# Patient Record
Sex: MALE | Race: WHITE | NOT HISPANIC OR LATINO | Employment: FULL TIME | URBAN - METROPOLITAN AREA
[De-identification: names, ages, dates, MRNs, and addresses within clinical notes are randomized per-mention and may not be internally consistent; named-entity substitution may affect disease eponyms.]

---

## 2019-12-05 ENCOUNTER — TRANSCRIBE ORDERS (OUTPATIENT)
Dept: PHYSICAL THERAPY | Facility: HOSPITAL | Age: 44
End: 2019-12-05

## 2019-12-05 DIAGNOSIS — M25.511 RIGHT SHOULDER PAIN, UNSPECIFIED CHRONICITY: Primary | ICD-10-CM

## 2019-12-05 DIAGNOSIS — M54.2 NECK PAIN: ICD-10-CM

## 2019-12-11 ENCOUNTER — APPOINTMENT (OUTPATIENT)
Dept: PHYSICAL THERAPY | Facility: HOSPITAL | Age: 44
End: 2019-12-11

## 2019-12-12 ENCOUNTER — HOSPITAL ENCOUNTER (OUTPATIENT)
Dept: PHYSICAL THERAPY | Facility: HOSPITAL | Age: 44
Setting detail: THERAPIES SERIES
Discharge: HOME | End: 2019-12-12

## 2019-12-12 DIAGNOSIS — M25.511 CHRONIC RIGHT SHOULDER PAIN: ICD-10-CM

## 2019-12-12 DIAGNOSIS — M54.2 NECK PAIN: Primary | ICD-10-CM

## 2019-12-12 DIAGNOSIS — G89.29 CHRONIC RIGHT SHOULDER PAIN: ICD-10-CM

## 2019-12-12 PROCEDURE — 97161 PT EVAL LOW COMPLEX 20 MIN: CPT

## 2019-12-12 PROCEDURE — 97140 MANUAL THERAPY 1/> REGIONS: CPT

## 2019-12-12 PROCEDURE — 97012 MECHANICAL TRACTION THERAPY: CPT

## 2019-12-13 NOTE — THERAPY EVALUATION
Outpatient Physical Therapy Ortho Initial Evaluation   [place]     Patient Name: Jose Luis Zelaya  : 1975  MRN: [id number]  Today's Date: 2019      Visit Date: 2019    There is no problem list on file for this patient.       Past Medical History:   Diagnosis Date   • Anxiety    • Depression    • Diabetes mellitus (CMS/HCC)    • Hypertension    • PEDRITO (obstructive sleep apnea)    • PTSD (post-traumatic stress disorder)         Past Surgical History:   Procedure Laterality Date   • KNEE ARTHROSCOPY     • VASECTOMY         Visit Dx:     ICD-10-CM ICD-9-CM   1. Neck pain M54.2 723.1   2. Chronic right shoulder pain M25.511 719.41    G89.29 338.29         Patient History     Row Name 19 0500             History    Chief Complaint  Difficulty with daily activities;Muscle tenderness;Muscle weakness;Numbness;Pain;Tinglings  -AS      Type of Pain  Neck pain;Shoulder pain  -AS      Date Current Problem(s) Began  -- Several months ago  -AS      Brief Description of Current Complaint  Patient states he injured his right shoulder years ago and had an injection and the pain was resolved. He states a few months ago he started having right shoulder pain again and then started having right sided neck pain with numbness and tingling into his RUE and right hand. 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 He states he is having trouble feeling veins and other important job duties that are affecting his job. He also reports he feels like his  strength has been affected.  -AS      Patient/Caregiver Goals  Relieve pain;Return to prior level of function;Improve strength  -AS      Patient's Rating of General Health  Very good  -AS      Hand Dominance  left-handed  -AS      Occupation/sports/leisure activities  ER Nurse at [employer]  -AS      Patient seeing anyone else for problem(s)?  Kristan Gerber  -AS      How has patient  tried to help current problem?  exercises, heat, ice, home traction unit  -AS      What clinical tests have you had for this problem?  X-ray;MRI 1 year ago, nothing more recent  -AS      Results of Clinical Tests  cervical disc bulge  -AS         Pain     Pain Location  Arm;Neck  -AS      Pain at Present  5  -AS      Pain at Best  3  -AS      Pain at Worst  9  -AS      Pain Frequency  Constant/continuous  -AS      Pain Description  Numbness;Tingling;Aching  -AS      What Performance Factors Make the Current Problem(s) WORSE?  Anything  -AS      What Performance Factors Make the Current Problem(s) BETTER?  Nothing  -AS         Daily Activities    Primary Language  English  -AS      How does patient learn best?  Listening;Reading  -AS      Teaching needs identified  Home Exercise Program;Management of Condition  -AS      Patient is concerned about/has problems with  Flexibility;Performing home management (household chores, shopping, care of dependents);Performing job responsibilities/community activities (work, school,;Performing sports, recreation, and play activities;Reaching over head;Repetitive movements of the hand, arm, shoulder  -AS      Does patient have problems with the following?  Depression;Anxiety  -AS      Barriers to learning  None  -AS      Pt Participated in POC and Goals  Yes  -AS         Safety    Are you being hurt, hit, or frightened by anyone at home or in your life?  No  -AS      Are you being neglected by a caregiver  No  -AS        User Key  (r) = Recorded By, (t) = Taken By, (c) = Cosigned By    Initials Name Provider Type    AS Kevin Arroyo, PT Physical Therapist          PT Ortho     Row Name 12/13/19 1415       Precautions and Contraindications    Precautions/Limitations  no known precautions/limitations  -AS       Subjective Pain    Able to rate subjective pain?  yes  -AS    Pre-Treatment Pain Level  5  -AS    Post-Treatment Pain Level  4  -AS       Posture/Observations     Posture- WNL  Posture is WNL  -AS       Cervical Palpation    Suboccipital  Right:;Tender  -AS    Cervical Facets  Right:;Tender  -AS    Levator Scapula  Right:;Tender  -AS    Upper Traps  Right:;Tender  -AS       Cervical/Thoracic Special Tests    Cervical Compression (Forarminal Compression vs. Facet Pain)  Right:;Positive  -AS    Cervical Distraction (Foraminal Compression vs. Facet Pain)  Right:;Positive  -AS       Head/Neck/Trunk    Neck Extension AROM  33  -AS    Neck Flexion AROM  41  -AS    Neck Lt Lateral Flexion AROM  40  -AS    Neck Rt Lateral Flexion AROM  40  -AS    Neck Lt Rotation AROM  68  -AS    Neck Rt Rotation AROM  60  -AS       MMT Neck/Trunk    Neck Flexion MMT, Gross Movement  (4-/5) good minus  -AS    Neck Extension MMT, Gross Movement  (4-/5) good minus  -AS       MMT Right Upper Ext    Rt Shoulder Flexion MMT, Gross Movement  (4/5) good  -AS    Rt Shoulder ABduction MMT, Gross Movement  (4/5) good  -AS    Rt Shoulder Internal Rotation MMT, Gross Movement  (4/5) good  -AS    Rt Shoulder External Rotation MMT, Gross Movement  (4/5) good  -AS       Sensation    Light Touch  Partial deficits in the RUE  -AS       Upper Extremity Flexibility    Upper Trapezius  Right:;Mildly limited  -AS    Levator Scapula  Right:;Mildly limited  -AS      User Key  (r) = Recorded By, (t) = Taken By, (c) = Cosigned By    Initials Name Provider Type    AS Kevin Arroyo, PT Physical Therapist                      Therapy Education  Given: HEP, Symptoms/condition management, Pain management  Program: New  How Provided: Verbal, Demonstration, Written  Provided to: Patient  Level of Understanding: Teach back education performed, Verbalized, Demonstrated     PT OP Goals     Row Name 12/13/19 0500          PT Short Term Goals    STG Date to Achieve  01/03/20  -AS     STG 1  Patient to report decreased radicular symptoms in his RUE by 25-50%.  -AS     STG 2  Patient to demonstrate improved neck strength to 4/5 in  all planes.  -AS     STG 3  Patient to demonstrate improved RUE strength to 4+/5 in all planes.  -AS        Long Term Goals    LTG Date to Achieve  01/24/20  -AS     LTG 1  Patient to report decreased radicular symptoms in his RUE by %.  -AS     LTG 2  Patient to demonstrate improved neck strength to 4+/5 in all planes.  -AS     LTG 3  Patient to demonstrate improved RUE strength to 5/5 in all planes.  -AS     LTG 4  Patient to demonstrate improved cervical ROM to WNL in all planes.  -AS     LTG 5  Patient to report improved function and decreased pain on Quick DASH by >10-15 points.  -AS        Time Calculation    PT Goal Re-Cert Due Date  01/10/20  -AS       User Key  (r) = Recorded By, (t) = Taken By, (c) = Cosigned By    Initials Name Provider Type    AS Kevin Arroyo, PT Physical Therapist          PT Assessment/Plan     Row Name 12/13/19 0500          PT Assessment    Functional Limitations  Limitation in home management;Limitations in community activities;Performance in leisure activities;Performance in self-care ADL;Performance in sport activities;Performance in work activities  -AS     Impairments  Muscle strength;Pain;Range of motion  -AS     Assessment Comments  Patient reports to outpatient PT with complaints of RUE and neck pain the past several months. 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 Patient demonstrates limited cervical ROM, limited cervical and RUE strength, and increased RUE symptoms with activity. Patient has limited function at this time secondary to the above.  -AS     Please refer to paper survey for additional self-reported information  Yes  -AS     Rehab Potential  Good  -AS     Patient/caregiver participated in establishment of treatment plan and goals  Yes  -AS     Patient would benefit from skilled therapy intervention  Yes  -AS        PT Plan    PT Frequency  2x/week;3x/week  -AS      Predicted Duration of Therapy Intervention (Therapy Eval)  4-6 weeks  -AS     Planned CPT's?  PT RE-EVAL: 36339;PT THER PROC EA 15 MIN: 72086;PT THER ACT EA 15 MIN: 66131;PT MANUAL THERAPY EA 15 MIN: 78731;PT NEUROMUSC RE-EDUCATION EA 15 MIN: 19139;PT ELECTRICAL STIM UNATTEND: ;PT ULTRASOUND EA 15 MIN: 95882;PT TRACTION CERVICAL: 56691;PT HOT/COLD PACK WC NONMCARE: 52542  -AS       User Key  (r) = Recorded By, (t) = Taken By, (c) = Cosigned By    Initials Name Provider Type    AS Kevin Arroyo, PT Physical Therapist          Modalities     Row Name 12/13/19 0500             Moist Heat    MH Applied  Yes  -AS      Location  Cervical - Sitting  -AS      Rx Minutes  10 mins  -AS      MH Prior to Rx  Yes  -AS         Traction 14873    Traction Type  Cervical  -AS      Rx Minutes  15  -AS      Duration  Intermittent  -AS      Position  Supine  -AS      Weight  25 started at 15#, patient requested the pull to be increased  -AS      Hold  60  -AS      Relax  10  -AS        User Key  (r) = Recorded By, (t) = Taken By, (c) = Cosigned By    Initials Name Provider Type    AS Kevin Arroyo, PT Physical Therapist        OP Exercises     Row Name 12/13/19 0500             Subjective Pain    Able to rate subjective pain?  yes  -AS      Pre-Treatment Pain Level  5  -AS      Post-Treatment Pain Level  4  -AS        User Key  (r) = Recorded By, (t) = Taken By, (c) = Cosigned By    Initials Name Provider Type    AS Kevin Arroyo, PT Physical Therapist           Manual Rx (last 36 hours)      Manual Treatments     Row Name 12/13/19 0500             Manual Rx 1    Manual Rx 1 Location  Cervical  -AS      Manual Rx 1 Type  Manual Distraction  -AS      Manual Rx 1 Duration  10 min  -AS        User Key  (r) = Recorded By, (t) = Taken By, (c) = Cosigned By    Initials Name Provider Type    AS Kevin Arroyo, PT Physical Therapist                      Outcome Measure Options: Quick DASH  Quick DASH  Open a  tight or new jar.: Mild Difficulty  Do heavy household chores (e.g., wash walls, wash floors): Mild Difficulty  Carry a shopping bag or briefcase: Mild Difficulty  Wash your back: Moderate Difficulty  Use a knife to cut food: Mild Difficulty  Recreational activities in which you take some force or impact through your arm, should or hand (e.g. golf, hammering, tennis, etc.): Mild Difficulty  During the past week, to what extent has your arm, shoulder, or hand problem interfered with your normal social activites with family, friends, neighbors or groups?: Moderately  During the past week, were you limited in your work or other regular daily activities as a result of your arm, shoulder or hand problem?: Moderately Limited  Arm, Shoulder, or hand pain: Moderate  Tingling (pins and needles) in your arm, shoulder, or hand: Severe  During the past week, how much difficulty have you had sleeping because of the pain in your arm, shoulder or hand?: Moderate Difficiculty  Number of Questions Answered: 11  Quick DASH Score: 40.91  Work Module (Optional)  Using your usual technique for your work?: Moderate Difficulty  Doing your usual work because of arm, shoulder or hand pain?: Moderate Difficulty  Doing your work as well as you would like?: Moderate Difficulty  Spending your usual amount of time doing your work?: Mild Difficulty  Work Module Score: 43.75         Time Calculation:     Start Time: 0900  Stop Time: 0958  Time Calculation (min): 58 min     Therapy Charges for Today     Code Description Service Date Service Provider Modifiers Qty    74218315537 HC PT EVAL LOW COMPLEXITY 1 12/12/2019 Kevin Arroyo, PT GP 1    75204022580 HC PT TRACTION CERVICAL 12/12/2019 Kevin Arroyo, PT GP 1    31446579493 HC PT MANUAL THERAPY EA 15 MIN 12/12/2019 Kevin Arroyo, PT GP 1          PT G-Codes  Outcome Measure Options: Quick DASH  Quick DASH Score: 40.91         Kevin Arroyo, PT  12/13/2019

## 2019-12-18 ENCOUNTER — HOSPITAL ENCOUNTER (OUTPATIENT)
Dept: PHYSICAL THERAPY | Facility: HOSPITAL | Age: 44
Setting detail: THERAPIES SERIES
Discharge: HOME | End: 2019-12-18

## 2019-12-18 DIAGNOSIS — M25.511 CHRONIC RIGHT SHOULDER PAIN: ICD-10-CM

## 2019-12-18 DIAGNOSIS — M54.2 NECK PAIN: Primary | ICD-10-CM

## 2019-12-18 DIAGNOSIS — G89.29 CHRONIC RIGHT SHOULDER PAIN: ICD-10-CM

## 2019-12-18 PROCEDURE — 97012 MECHANICAL TRACTION THERAPY: CPT

## 2019-12-18 NOTE — THERAPY TREATMENT NOTE
Outpatient Physical Therapy Ortho Treatment Note  [facility]     Patient Name: Jose Luis Zelaya  : 1975  MRN: [id number]  Today's Date: 2019      Visit Date: 2019    Visit Dx:    ICD-10-CM ICD-9-CM   1. Neck pain M54.2 723.1   2. Chronic right shoulder pain M25.511 719.41    G89.29 338.29       There is no problem list on file for this patient.       Past Medical History:   Diagnosis Date   • Anxiety    • Depression    • Diabetes mellitus (CMS/HCC)    • Hypertension    • PEDRITO (obstructive sleep apnea)    • PTSD (post-traumatic stress disorder)         Past Surgical History:   Procedure Laterality Date   • KNEE ARTHROSCOPY     • VASECTOMY                         PT Assessment/Plan     Row Name 19          PT Assessment    Assessment Comments  Continued with cervical traction today as patient reports good tolerance to this at his last treatment session as well as decreased symptoms into his right hand/fingers.  -AS        PT Plan    PT Plan Comments  Continue with current treatment plan.  -AS       User Key  (r) = Recorded By, (t) = Taken By, (c) = Cosigned By    Initials Name Provider Type    AS Kevin Arroyo, PT Physical Therapist          Modalities     Row Name 19             Traction 25774    Traction Type  Cervical  -AS      Rx Minutes  20  -AS      Duration  Intermittent  -AS      Position  Supine  -AS      Weight  25 started at 15#, patient requested the pull to be increased  -AS      Hold  80  -AS      Relax  10  -AS        User Key  (r) = Recorded By, (t) = Taken By, (c) = Cosigned By    Initials Name Provider Type    AS Kevin Arroyo, PT Physical Therapist        OP Exercises     Row Name 19             Subjective Comments    Subjective Comments  Patient states he tolerated his traction well at his last treatment session with little to no soreness. He states he feels the numbness and tingling into his finger tips have decreased.  -AS          Subjective Pain    Able to rate subjective pain?  yes  -AS      Pre-Treatment Pain Level  4  -AS      Post-Treatment Pain Level  3  -AS        User Key  (r) = Recorded By, (t) = Taken By, (c) = Cosigned By    Initials Name Provider Type    AS Kevin Arroyo, PT Physical Therapist                                          Time Calculation:   Start Time: 0700  Stop Time: 0736  Time Calculation (min): 36 min  Therapy Charges for Today     Code Description Service Date Service Provider Modifiers Qty    78096000734 HC PT TRACTION CERVICAL 12/18/2019 Kevin Arroyo, PT GP 1                    Kevin Arroyo, PT  12/18/2019

## 2019-12-19 ENCOUNTER — HOSPITAL ENCOUNTER (OUTPATIENT)
Dept: PHYSICAL THERAPY | Facility: HOSPITAL | Age: 44
Setting detail: THERAPIES SERIES
Discharge: HOME | End: 2019-12-19

## 2019-12-19 DIAGNOSIS — M25.511 CHRONIC RIGHT SHOULDER PAIN: ICD-10-CM

## 2019-12-19 DIAGNOSIS — M54.2 NECK PAIN: Primary | ICD-10-CM

## 2019-12-19 DIAGNOSIS — G89.29 CHRONIC RIGHT SHOULDER PAIN: ICD-10-CM

## 2019-12-19 PROCEDURE — 97012 MECHANICAL TRACTION THERAPY: CPT

## 2019-12-19 NOTE — THERAPY TREATMENT NOTE
Outpatient Physical Therapy Ortho Treatment Note  [facility]     Patient Name: Jose Luis Zelaya  : 1975  MRN: [id number]  Today's Date: 2019      Visit Date: 2019    Visit Dx:    ICD-10-CM ICD-9-CM   1. Neck pain M54.2 723.1   2. Chronic right shoulder pain M25.511 719.41    G89.29 338.29       There is no problem list on file for this patient.       Past Medical History:   Diagnosis Date   • Anxiety    • Depression    • Diabetes mellitus (CMS/HCC)    • Hypertension    • PEDRITO (obstructive sleep apnea)    • PTSD (post-traumatic stress disorder)         Past Surgical History:   Procedure Laterality Date   • KNEE ARTHROSCOPY     • VASECTOMY                         PT Assessment/Plan     Row Name 19 0700          PT Assessment    Assessment Comments  Patient continues to demonstrate improvement in his symptoms with cervical traction.  -AS        PT Plan    PT Plan Comments  Continue with current treatment plan.  -AS       User Key  (r) = Recorded By, (t) = Taken By, (c) = Cosigned By    Initials Name Provider Type    AS Kevin Arroyo, PT Physical Therapist          Modalities     Row Name 19 0600             Traction 58750    Traction Type  Cervical  -AS      Rx Minutes  20  -AS      Duration  Intermittent  -AS      Position  Supine  -AS      Weight  27 Started at 27#, pt. requested the traction be increased  -AS      Hold  80  -AS      Relax  10  -AS        User Key  (r) = Recorded By, (t) = Taken By, (c) = Cosigned By    Initials Name Provider Type    AS Kevin Arroyo, PT Physical Therapist        OP Exercises     Row Name 19 0600             Subjective Comments    Subjective Comments  Patient states he was a little sore after his last treatment session but states it was not bad. Patient reports he is doing well this morning.  -AS        User Key  (r) = Recorded By, (t) = Taken By, (c) = Cosigned By    Initials Name Provider Type    AS Kevin Arroyo, PT  Physical Therapist                                          Time Calculation:   Start Time: 0650  Stop Time: 0721  Time Calculation (min): 31 min  Therapy Charges for Today     Code Description Service Date Service Provider Modifiers Qty    61523967118 HC PT TRACTION CERVICAL 12/19/2019 Kevin Arroyo, PT GP 1                    Kevin Arroyo, PT  12/19/2019

## 2019-12-23 ENCOUNTER — HOSPITAL ENCOUNTER (OUTPATIENT)
Dept: PHYSICAL THERAPY | Facility: HOSPITAL | Age: 44
Setting detail: THERAPIES SERIES
Discharge: HOME | End: 2019-12-23

## 2019-12-23 DIAGNOSIS — G89.29 CHRONIC RIGHT SHOULDER PAIN: ICD-10-CM

## 2019-12-23 DIAGNOSIS — M54.2 NECK PAIN: Primary | ICD-10-CM

## 2019-12-23 DIAGNOSIS — M25.511 CHRONIC RIGHT SHOULDER PAIN: ICD-10-CM

## 2019-12-23 PROCEDURE — 97012 MECHANICAL TRACTION THERAPY: CPT

## 2019-12-23 NOTE — THERAPY TREATMENT NOTE
Outpatient Physical Therapy Ortho Treatment Note  [facility]     Patient Name: Jose Luis Zelaya  : 1975  MRN: [id number]  Today's Date: 2019      Visit Date: 2019    Visit Dx:    ICD-10-CM ICD-9-CM   1. Neck pain M54.2 723.1   2. Chronic right shoulder pain M25.511 719.41    G89.29 338.29       There is no problem list on file for this patient.       Past Medical History:   Diagnosis Date   • Anxiety    • Depression    • Diabetes mellitus (CMS/HCC)    • Hypertension    • PEDRITO (obstructive sleep apnea)    • PTSD (post-traumatic stress disorder)         Past Surgical History:   Procedure Laterality Date   • KNEE ARTHROSCOPY     • VASECTOMY                         PT Assessment/Plan     Row Name 19 0600          PT Assessment    Assessment Comments  Patient is reporting benefits from cervical traction.  -AS        PT Plan    PT Plan Comments  Continue with current treatment plan.  -AS       User Key  (r) = Recorded By, (t) = Taken By, (c) = Cosigned By    Initials Name Provider Type    AS Kevin Arroyo, PT Physical Therapist          Modalities     Row Name 19 0500             Subjective Comments    Subjective Comments  Patient states he felt good after his last treatment session but states his pain returned over the weekend.  -AS         Traction 84038    Traction Type  Cervical  -AS      Rx Minutes  20  -AS      Duration  Intermittent  -AS      Position  Supine  -AS      Weight  30  -AS      Hold  80  -AS      Relax  10  -AS        User Key  (r) = Recorded By, (t) = Taken By, (c) = Cosigned By    Initials Name Provider Type    AS Kevin Arroyo, PT Physical Therapist        OP Exercises     Row Name 19 0500             Subjective Comments    Subjective Comments  Patient states he felt good after his last treatment session but states his pain returned over the weekend.  -AS        User Key  (r) = Recorded By, (t) = Taken By, (c) = Cosigned By    Initials Name  Provider Type    AS Kevin Arroyo, PT Physical Therapist                                          Time Calculation:   Start Time: 0643  Stop Time: 0707  Time Calculation (min): 24 min  Therapy Charges for Today     Code Description Service Date Service Provider Modifiers Qty    37807882329 HC PT TRACTION CERVICAL 12/23/2019 Kevin Arroyo, PT GP 1                    Kevin Arroyo, PT  12/23/2019

## 2019-12-27 ENCOUNTER — HOSPITAL ENCOUNTER (OUTPATIENT)
Dept: PHYSICAL THERAPY | Facility: HOSPITAL | Age: 44
Setting detail: THERAPIES SERIES
Discharge: HOME | End: 2019-12-27

## 2019-12-27 DIAGNOSIS — M25.511 CHRONIC RIGHT SHOULDER PAIN: ICD-10-CM

## 2019-12-27 DIAGNOSIS — G89.29 CHRONIC RIGHT SHOULDER PAIN: ICD-10-CM

## 2019-12-27 DIAGNOSIS — M54.2 NECK PAIN: Primary | ICD-10-CM

## 2019-12-27 PROCEDURE — 97012 MECHANICAL TRACTION THERAPY: CPT

## 2019-12-27 NOTE — THERAPY TREATMENT NOTE
Outpatient Physical Therapy Ortho Treatment Note  [facility]     Patient Name: Jose Luis Zelaya  : 1975  MRN: [id number]  Today's Date: 2019      Visit Date: 2019    Visit Dx:    ICD-10-CM ICD-9-CM   1. Neck pain M54.2 723.1   2. Chronic right shoulder pain M25.511 719.41    G89.29 338.29       There is no problem list on file for this patient.       Past Medical History:   Diagnosis Date   • Anxiety    • Depression    • Diabetes mellitus (CMS/HCC)    • Hypertension    • PEDRITO (obstructive sleep apnea)    • PTSD (post-traumatic stress disorder)         Past Surgical History:   Procedure Laterality Date   • KNEE ARTHROSCOPY     • VASECTOMY                         PT Assessment/Plan     Row Name 19 06          PT Assessment    Assessment Comments  Patient continues to report relief in his symptoms with cervical traction, however his relief is temporary and lasts about 4-5 hours and then his symptoms return.  -AS        PT Plan    PT Plan Comments  Continue with current treatment plan.  -AS       User Key  (r) = Recorded By, (t) = Taken By, (c) = Cosigned By    Initials Name Provider Type    AS Kevin Arroyo, PT Physical Therapist          Modalities     Row Name 19 06             Traction 95442    Traction Type  Cervical  -AS      Rx Minutes  20  -AS      Duration  Intermittent  -AS      Position  Supine  -AS      Weight  30  -AS      Hold  80  -AS      Relax  10  -AS        User Key  (r) = Recorded By, (t) = Taken By, (c) = Cosigned By    Initials Name Provider Type    AS Kevin Arroyo, PT Physical Therapist        OP Exercises     Row Name 19 06             Subjective Comments    Subjective Comments  Patient states he continues to have the numbness in his right fingers/hand. He denies having pain at this time. He states he does get relief from the cervical traction and last about 4-5 hours but then his symptoms return.  -AS        User Key  (r) = Recorded  By, (t) = Taken By, (c) = Cosigned By    Initials Name Provider Type    AS Kevin Arroyo, PT Physical Therapist                                          Time Calculation:   Start Time: 0649  Stop Time: 0716  Time Calculation (min): 27 min  Therapy Charges for Today     Code Description Service Date Service Provider Modifiers Qty    81338991024 HC PT TRACTION CERVICAL 12/27/2019 Kevin Arroyo, PT GP 1                    Kevin Arroyo, PT  12/27/2019

## 2019-12-31 ENCOUNTER — HOSPITAL ENCOUNTER (OUTPATIENT)
Dept: PHYSICAL THERAPY | Facility: HOSPITAL | Age: 44
Setting detail: THERAPIES SERIES
Discharge: HOME | End: 2019-12-31

## 2019-12-31 DIAGNOSIS — M25.511 CHRONIC RIGHT SHOULDER PAIN: ICD-10-CM

## 2019-12-31 DIAGNOSIS — G89.29 CHRONIC RIGHT SHOULDER PAIN: ICD-10-CM

## 2019-12-31 DIAGNOSIS — M54.2 NECK PAIN: Primary | ICD-10-CM

## 2019-12-31 PROCEDURE — 97012 MECHANICAL TRACTION THERAPY: CPT

## 2020-01-02 ENCOUNTER — HOSPITAL ENCOUNTER (OUTPATIENT)
Dept: PHYSICAL THERAPY | Facility: HOSPITAL | Age: 45
Setting detail: THERAPIES SERIES
Discharge: HOME | End: 2020-01-02

## 2020-01-02 DIAGNOSIS — M54.2 NECK PAIN: Primary | ICD-10-CM

## 2020-01-02 DIAGNOSIS — G89.29 CHRONIC RIGHT SHOULDER PAIN: ICD-10-CM

## 2020-01-02 DIAGNOSIS — M25.511 CHRONIC RIGHT SHOULDER PAIN: ICD-10-CM

## 2020-01-02 PROCEDURE — 97012 MECHANICAL TRACTION THERAPY: CPT

## 2020-01-02 NOTE — THERAPY TREATMENT NOTE
Outpatient Physical Therapy Ortho Treatment Note  [facility]     Patient Name: Jose Luis Zelaya  : 1975  MRN: [id number]  Today's Date: 2020      Visit Date: 2020    Visit Dx:    ICD-10-CM ICD-9-CM   1. Neck pain M54.2 723.1   2. Chronic right shoulder pain M25.511 719.41    G89.29 338.29       There is no problem list on file for this patient.       Past Medical History:   Diagnosis Date   • Anxiety    • Depression    • Diabetes mellitus (CMS/HCC)    • Hypertension    • PEDRITO (obstructive sleep apnea)    • PTSD (post-traumatic stress disorder)         Past Surgical History:   Procedure Laterality Date   • KNEE ARTHROSCOPY     • VASECTOMY                         PT Assessment/Plan     Row Name 20 06          PT Assessment    Assessment Comments  Discussed with patient about contacting his MD for further follow-up. he does continue to have relief in his symptoms but his relief is temporary.  -AS        PT Plan    PT Plan Comments  Continue with current treatment plan.  -AS       User Key  (r) = Recorded By, (t) = Taken By, (c) = Cosigned By    Initials Name Provider Type    AS Kevin Arroyo, PT Physical Therapist          Modalities     Row Name 20 0600             Traction 85387    Traction Type  Cervical  -AS      Rx Minutes  20  -AS      Duration  Intermittent  -AS      Position  Supine  -AS      Weight  30  -AS      Hold  70  -AS      Relax  10  -AS        User Key  (r) = Recorded By, (t) = Taken By, (c) = Cosigned By    Initials Name Provider Type    AS Kevin Arroyo, PT Physical Therapist        OP Exercises     Row Name 20 06             Subjective Comments    Subjective Comments  Patient states he continues to have relief in his symptoms with cervical traction but his relief is temporary.  -AS        User Key  (r) = Recorded By, (t) = Taken By, (c) = Cosigned By    Initials Name Provider Type    AS Kevin Arroyo, PT Physical Therapist                                           Time Calculation:   Start Time: 0637  Stop Time: 0703  Time Calculation (min): 26 min  Therapy Charges for Today     Code Description Service Date Service Provider Modifiers Qty    98859864787 HC PT TRACTION CERVICAL 1/2/2020 Kevin Arroyo, PT GP 1                    Kevin Arroyo, PT  1/2/2020

## 2020-07-23 ENCOUNTER — TELEPHONE (OUTPATIENT)
Dept: ORTHOPEDIC SURGERY | Facility: CLINIC | Age: 45
End: 2020-07-23

## 2020-07-23 NOTE — TELEPHONE ENCOUNTER
FYI: Patient has an appointment with [facility] on 7/27 as a new patient, and called to inform the office he has been diagnosed with Shingles which is in unexposed area.

## 2020-08-03 ENCOUNTER — OFFICE VISIT (OUTPATIENT)
Dept: ORTHOPEDIC SURGERY | Facility: CLINIC | Age: 45
End: 2020-08-03

## 2020-08-03 VITALS — BODY MASS INDEX: 37.77 KG/M2 | HEIGHT: 69 IN | TEMPERATURE: 96.1 F | WEIGHT: 255 LBS

## 2020-08-03 DIAGNOSIS — S80.01XA CONTUSION OF RIGHT KNEE, INITIAL ENCOUNTER: Primary | ICD-10-CM

## 2020-08-03 PROCEDURE — 99203 OFFICE O/P NEW LOW 30 MIN: CPT | Performed by: NURSE PRACTITIONER

## 2020-08-03 RX ORDER — LISINOPRIL AND HYDROCHLOROTHIAZIDE 20; 12.5 MG/1; MG/1
1 TABLET ORAL DAILY
COMMUNITY

## 2020-08-03 RX ORDER — MELOXICAM 15 MG/1
15 TABLET ORAL DAILY
Qty: 30 TABLET | Refills: 1 | Status: SHIPPED | OUTPATIENT
Start: 2020-08-03 | End: 2020-09-28

## 2020-08-03 NOTE — PROGRESS NOTES
Patient: Jose Luis Zelaya    YOB: 1975    Medical Record Number: [id number]    Chief Complaints:  Right knee injury    History of Present Illness:     45 y.o. male patient who comes in for evaluation of the right knee injury.  Reports injury occurred on 7/18/2020.  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  Reports he slipped on the wet floor at the nurses station.  He landed awkwardly on the right knee.  He used ice and Tylenol and finish his shift that day and worked a full shift the following day.  Reports his pain and swelling persisted.  He was evaluated at [facility] on 07/23/20 with x-rays which were reportedly negative for fracture.  Describes his pain as moderate, constant, and stabbing.  Pain is worse with standing, sitting, driving, and walking.  He has used ice, elevation, rest, Tylenol and Motrin without relief.  Reports a snapping sensation of the patellar tendon.  Denies any instability.  Denies any radicular symptoms down the leg.  Denies other associated complaints or issues.    Allergies: No Known Allergies    Home Medications:    Current Outpatient Medications:   •  busPIRone (BUSPAR) 15 MG tablet, Take 15 mg by mouth 3 (Three) Times a Day., Disp: , Rfl:   •  cloNIDine (CATAPRES) 0.2 MG tablet, Take 0.2 mg by mouth 2 (Two) Times a Day., Disp: , Rfl:   •  Empagliflozin (JARDIANCE PO), Take 25 mg by mouth Daily., Disp: , Rfl:   •  lisinopril-hydrochlorothiazide (PRINZIDE,ZESTORETIC) 20-12.5 MG per tablet, Take 1 tablet by mouth Daily., Disp: , Rfl:   •  Melatonin 10 MG tablet, Take  by mouth., Disp: , Rfl:   •  metFORMIN (GLUCOPHAGE) 1000 MG tablet, Take 1,000 mg by mouth 2 (Two) Times a Day With Meals., Disp: , Rfl:   •  Multiple Vitamin (MULTI-VITAMIN DAILY PO), Take  by mouth., Disp: , Rfl:   •  sertraline (ZOLOFT) 50 MG tablet, Take 50 mg by mouth Daily., Disp: , Rfl:   •  lisinopril (PRINIVIL,ZESTRIL) 20 MG tablet, Take 20 mg by mouth Daily., Disp: , Rfl:    •  meloxicam (Mobic) 15 MG tablet, Take 1 tablet by mouth Daily., Disp: 30 tablet, Rfl: 1  •  SAXagliptin (ONGLYZA) 2.5 MG tablet, Take 2.5 mg by mouth Daily., Disp: , Rfl:   •  telmisartan-hydrochlorothiazide (MICARDIS HCT) 80-12.5 MG per tablet, Take 1 tablet by mouth Daily., Disp: , Rfl:     Past Medical History:   Diagnosis Date   • Anxiety    • Depression    • Diabetes mellitus (CMS/HCC)    • Hypertension    • PEDRITO (obstructive sleep apnea)    • PTSD (post-traumatic stress disorder)        Past Surgical History:   Procedure Laterality Date   • KNEE ARTHROSCOPY     • VASECTOMY         Social History     Occupational History   • Not on file   Tobacco Use   • Smoking status: Former Smoker   • Smokeless tobacco: Never Used   Substance and Sexual Activity   • Alcohol use: Yes   • Drug use: Defer   • Sexual activity: Defer      Social History     Social History Narrative   • Not on file       No family history on file.    Review of Systems:      Constitutional: Denies fever, shaking or chills   Eyes: Denies change in visual acuity   HEENT: Denies nasal congestion or sore throat   Respiratory: Denies cough or shortness of breath   Cardiovascular: Denies chest pain or edema  Endocrine: Denies tremors, palpitations, intolerance of heat or cold, polyuria, polydipsia.  GI: Denies abdominal pain, nausea, vomiting, bloody stools or diarrhea  : Denies frequency, urgency, incontinence, retention, or nocturia.  Musculoskeletal: Denies numbness, tingling or loss of motor function except as above  Integument: Denies rash, lesion or ulceration   Neurologic: Denies headache or focal weakness, deficits  Heme: Denies spontaneous or excessive bleeding, epistaxis, hematuria, melena, fatigue, enlarged or tender lymph nodes.      All other pertinent positives and negatives as noted above in HPI.    Physical Exam:   45 y.o. male  Vitals:    08/03/20 0800   Temp: 96.1 °F (35.6 °C)   TempSrc: Temporal   Weight: 116 kg (255 lb)   Height:  "175.3 cm (69\")     General:  Patient is awake and alert.  Appears in no acute distress or discomfort.    Psych:  Affect and demeanor are appropriate.    Eyes:  Conjunctiva and sclera appear grossly normal.  Eyes track well and EOM seem to be intact.    Ears:  No gross abnormalities.  Hearing adequate for the exam.    Cardiovascular:  Regular rate and rhythm.    Lungs:  Good chest expansion.  Breathing unlabored.    Spine:  Back appears grossly normal.  No palpable masses or adenopathy.  Good motion.  Straight leg raise and crossed straight leg raise manuever are both negative for lower leg and/or knee pain.    Right lower extremity:  Knee immobilizer is removed.  Skin is benign.   No obvious gross abnormalities on inspection including any swelling, masses, atrophy or obvious adenopathy.  No palpable masses or adenopathy.  No effusion.  Exquisite tenderness over the patellar tendon.  Motion is full and symmetric.  Some anterior crepitus noted with range of motion.  Negative medial and lateral Yola's.  Positive patellar grind test.  No instability or patellar apprehension.  Strength is well preserved including hip flexion, knee extension, ankle and toe plantarflexion, ankle inversion and eversion.  Good sensory function throughout the leg and foot.  Palpable pulses.  Gait is mildly antalgic.           Radiology:  Dr. Altman and I reviewed the x-rays together.  Outside bilateral AP views, merchants view of right knee, and a lateral view of the right knee are reviewed to evaluate the patient's complaint.  No comparison films are immediately available.  In the merchant view, there is a small cyst noted on the trochlea and the patella appears to be tilted laterally.  Otherwise, no obvious acute abnormalities, significant degenerative changes, or other concerning findings.    Assessment/Plan:  Right knee contusion    We discussed conservative treatment options in detail.  In regards to conservative treatment, I " recommended he continue to use rest, ice, compression with ace wraps, and elevation.  I have given him a prescription for meloxicam.  The risks of this medication were discussed.  I offered to refer him to physical therapy, but he declined.  Instead, he will perform stretches at home.  He may return to work immediately with the following restrictions:  No squatting, no repetitive bending or twisting the right knee.  He will follow-up with me in 4 weeks.    Joanne Jones, SARAH    08/03/2020

## 2020-08-13 ENCOUNTER — TELEPHONE (OUTPATIENT)
Dept: ORTHOPEDIC SURGERY | Facility: CLINIC | Age: 45
End: 2020-08-13

## 2020-08-13 DIAGNOSIS — S80.01XA CONTUSION OF RIGHT KNEE, INITIAL ENCOUNTER: Primary | ICD-10-CM

## 2020-08-13 NOTE — TELEPHONE ENCOUNTER
Patient is not getting any better. His RT knee is getting worse and would like to proceed with an MRI. Please advise.cld

## 2020-08-14 NOTE — TELEPHONE ENCOUNTER
Called stating patient says he has not heard back from our office about MRI. Says [facility] will approve it and see if can get done before f/u appt on 8/31/20.

## 2020-08-14 NOTE — TELEPHONE ENCOUNTER
Patient called back and I advised him that [facility] called and his voice mailbox is full. [facility] has ordered the right knee MRI. I transferred patient's call to [facility] to get this coordinated with work comp.

## 2020-08-14 NOTE — TELEPHONE ENCOUNTER
I attempted to reach patient by telephone.  No answer and his voice mailbox is full.  If he calls again, please let him know I was off yesterday and was rounding in the hospital this morning. I apologize for the delay in returning his call.  Additionally, please let him know I have ordered the right knee MRI.  I will call him with the results and come up with a plan at that time.  Thanks

## 2020-08-21 ENCOUNTER — HOSPITAL ENCOUNTER (OUTPATIENT)
Dept: MRI IMAGING | Facility: HOSPITAL | Age: 45
Discharge: HOME | End: 2020-08-21
Admitting: NURSE PRACTITIONER

## 2020-08-21 DIAGNOSIS — S80.01XA CONTUSION OF RIGHT KNEE, INITIAL ENCOUNTER: ICD-10-CM

## 2020-08-21 PROCEDURE — 73721 MRI JNT OF LWR EXTRE W/O DYE: CPT

## 2020-08-25 ENCOUNTER — TELEPHONE (OUTPATIENT)
Dept: ORTHOPEDIC SURGERY | Facility: CLINIC | Age: 45
End: 2020-08-25

## 2020-08-25 DIAGNOSIS — S80.01XA CONTUSION OF RIGHT KNEE, INITIAL ENCOUNTER: Primary | ICD-10-CM

## 2020-08-25 NOTE — TELEPHONE ENCOUNTER
I spoke to Mr. Zelaya.  I provided him with the right knee MRI results.  He has some chondromalacia, but no tears of the meniscus or ligaments of the knee.  Reports he continues to have pain as well as crepitus and clicking.  I recommended he try a steroid injection at his next visit.  I have entered a referral for physical therapy.  He acknowledged understanding and agreed with this plan.

## 2020-08-25 NOTE — TELEPHONE ENCOUNTER
Patient called to request results of RIGHT Knee MRI done 8/21/20. Patient can be reached at [phone]. Thanks /srh

## 2020-08-31 ENCOUNTER — OFFICE VISIT (OUTPATIENT)
Dept: ORTHOPEDIC SURGERY | Facility: CLINIC | Age: 45
End: 2020-08-31

## 2020-08-31 VITALS — WEIGHT: 267 LBS | HEIGHT: 69 IN | TEMPERATURE: 97.2 F | BODY MASS INDEX: 39.55 KG/M2

## 2020-08-31 DIAGNOSIS — S80.01XD CONTUSION OF RIGHT KNEE, SUBSEQUENT ENCOUNTER: Primary | ICD-10-CM

## 2020-08-31 DIAGNOSIS — M94.261 CHONDROMALACIA OF RIGHT KNEE: ICD-10-CM

## 2020-08-31 PROCEDURE — 20610 DRAIN/INJ JOINT/BURSA W/O US: CPT | Performed by: NURSE PRACTITIONER

## 2020-08-31 PROCEDURE — 99212 OFFICE O/P EST SF 10 MIN: CPT | Performed by: NURSE PRACTITIONER

## 2020-08-31 RX ORDER — METHYLPREDNISOLONE ACETATE 80 MG/ML
80 INJECTION, SUSPENSION INTRA-ARTICULAR; INTRALESIONAL; INTRAMUSCULAR; SOFT TISSUE
Status: COMPLETED | OUTPATIENT
Start: 2020-08-31 | End: 2020-08-31

## 2020-08-31 RX ADMIN — METHYLPREDNISOLONE ACETATE 80 MG: 80 INJECTION, SUSPENSION INTRA-ARTICULAR; INTRALESIONAL; INTRAMUSCULAR; SOFT TISSUE at 08:32

## 2020-08-31 NOTE — PROGRESS NOTES
"Chief Complaint:  Follow up right knee pain    HPI:  Mr. Zelaya returns to date for follow-up right knee pain.  Reports his knee is considerably better, however, he continues to have pain when kneeling.  Reports he has been using ice, rest, and meloxicam as needed.  He is currently waiting on a call from scheduling to get started with physical therapy.    Vitals:    08/31/20 0815   Temp: 97.2 °F (36.2 °C)   TempSrc: Temporal   Weight: 121 kg (267 lb)   Height: 175.3 cm (69\")     Exam: The right knee is examined briefly.  Skin is benign.  No obvious swelling.  No effusion.  Tenderness to palpation medial aspect of the patellar tendon.  Mildly positive patellar grind test.     Imaging:  None taken    Assessment:  Right knee contusion, improving    Plan:  We discussed conservative treatments in detail.  In addition to the things he is already doing, I recommended trying a cortisone injection.  The risks, benefits, alternatives to an injection were discussed.  Patient consented to proceed.  I advised him to attend physical therapy as previously ordered.  If his symptoms persist or worsen, I am happy to see him back.  He may return to work immediately without restrictions.      SARAH Ross  08/31/2020    Large Joint Arthrocentesis: R knee  Date/Time: 8/31/2020 8:32 AM  Consent given by: patient  Site marked: site marked  Timeout: Immediately prior to procedure a time out was called to verify the correct patient, procedure, equipment, support staff and site/side marked as required   Supporting Documentation  Indications: pain and joint swelling   Procedure Details  Location: knee - R knee  Preparation: Patient was prepped and draped in the usual sterile fashion  Needle gauge: 21 G.  Approach: anterolateral  Medications administered: 2 mL lidocaine (cardiac); 80 mg methylPREDNISolone acetate 80 MG/ML  Patient tolerance: patient tolerated the procedure well with no immediate complications          "

## 2020-09-27 DIAGNOSIS — S80.01XA CONTUSION OF RIGHT KNEE, INITIAL ENCOUNTER: ICD-10-CM

## 2020-09-28 RX ORDER — MELOXICAM 15 MG/1
TABLET ORAL
Qty: 30 TABLET | Refills: 1 | Status: SHIPPED | OUTPATIENT
Start: 2020-09-28

## 2020-12-21 ENCOUNTER — IMMUNIZATION (OUTPATIENT)
Dept: VACCINE CLINIC | Facility: HOSPITAL | Age: 45
End: 2020-12-21

## 2020-12-21 PROCEDURE — 0001A: CPT | Performed by: INTERNAL MEDICINE

## 2020-12-21 PROCEDURE — 91300 HC SARSCOV02 VAC 30MCG/0.3ML IM: CPT | Performed by: INTERNAL MEDICINE

## 2021-01-11 ENCOUNTER — IMMUNIZATION (OUTPATIENT)
Dept: VACCINE CLINIC | Facility: HOSPITAL | Age: 46
End: 2021-01-11

## 2021-01-11 PROCEDURE — 91300 HC SARSCOV02 VAC 30MCG/0.3ML IM: CPT | Performed by: INTERNAL MEDICINE

## 2021-01-11 PROCEDURE — 0001A: CPT | Performed by: INTERNAL MEDICINE

## 2021-01-11 PROCEDURE — 0002A: CPT | Performed by: INTERNAL MEDICINE

## 2021-10-06 ENCOUNTER — IMMUNIZATION (OUTPATIENT)
Dept: VACCINE CLINIC | Facility: HOSPITAL | Age: 46
End: 2021-10-06

## 2021-10-06 PROCEDURE — 91300 HC SARSCOV02 VAC 30MCG/0.3ML IM: CPT | Performed by: INTERNAL MEDICINE

## 2021-10-06 PROCEDURE — 0004A ADM SARSCOV2 30MCG/0.3ML BOOSTER: CPT | Performed by: INTERNAL MEDICINE

## 2021-10-06 PROCEDURE — 0003A: CPT | Performed by: INTERNAL MEDICINE

## 2022-12-28 ENCOUNTER — HOSPITAL ENCOUNTER (EMERGENCY)
Facility: HOSPITAL | Age: 47
End: 2022-12-28

## 2024-11-03 ENCOUNTER — APPOINTMENT (OUTPATIENT)
Dept: GENERAL RADIOLOGY | Facility: HOSPITAL | Age: 49
End: 2024-11-03
Payer: OTHER GOVERNMENT

## 2024-11-03 ENCOUNTER — HOSPITAL ENCOUNTER (EMERGENCY)
Facility: HOSPITAL | Age: 49
Discharge: HOME | End: 2024-11-03
Attending: EMERGENCY MEDICINE | Admitting: EMERGENCY MEDICINE
Payer: OTHER GOVERNMENT

## 2024-11-03 VITALS
SYSTOLIC BLOOD PRESSURE: 134 MMHG | RESPIRATION RATE: 18 BRPM | HEART RATE: 76 BPM | OXYGEN SATURATION: 96 % | TEMPERATURE: 97.4 F | DIASTOLIC BLOOD PRESSURE: 93 MMHG

## 2024-11-03 DIAGNOSIS — M25.512 ACUTE PAIN OF LEFT SHOULDER: Primary | ICD-10-CM

## 2024-11-03 PROCEDURE — 99283 EMERGENCY DEPT VISIT LOW MDM: CPT

## 2024-11-03 PROCEDURE — 63710000001 PREDNISONE PER 1 MG: Performed by: EMERGENCY MEDICINE

## 2024-11-03 PROCEDURE — 73030 X-RAY EXAM OF SHOULDER: CPT

## 2024-11-03 RX ORDER — OXYCODONE AND ACETAMINOPHEN 7.5; 325 MG/1; MG/1
1 TABLET ORAL EVERY 6 HOURS PRN
Qty: 12 TABLET | Refills: 0 | Status: SHIPPED | OUTPATIENT
Start: 2024-11-03

## 2024-11-03 RX ORDER — PREDNISONE 20 MG/1
20 TABLET ORAL 2 TIMES DAILY
Qty: 10 TABLET | Refills: 0 | Status: SHIPPED | OUTPATIENT
Start: 2024-11-03

## 2024-11-03 RX ORDER — PREDNISONE 20 MG/1
40 TABLET ORAL ONCE
Status: COMPLETED | OUTPATIENT
Start: 2024-11-03 | End: 2024-11-03

## 2024-11-03 RX ADMIN — PREDNISONE 40 MG: 20 TABLET ORAL at 11:50

## 2024-11-03 NOTE — DISCHARGE INSTRUCTIONS
Rest at home avoiding any particularly strenuous activity today.     Eat small, frequent meals and drink plenty of fluids. Take any medication prescribed as instructed.       Monitor for any signs of recurrent symptoms or worsening and see your primary doctor to discuss your ER visit while returning to the ER if any concerns as we discussed.

## 2024-11-03 NOTE — ED PROVIDER NOTES
EMERGENCY DEPARTMENT ENCOUNTER    Room Number:  26/26  PCP: Provider, No Known  Independent Historians: Patient    HPI:  Chief Complaint: had concerns including Shoulder Pain.      A complete HPI/ROS/PMH/PSH/SH/FH are unobtainable due to: None    Chronic or social conditions impacting patient care (Social Determinants of Health): None      Context: Jose Luis Zelaya is a 49 y.o. male with a medical history of HTN and DM who presents to the ED c/o acute on chronic left shoulder pain. [facility].  [facility] Ashley for shoulder replacement. Pt has had MRIs, PT, CT scans, xrays etc. No new injury but over last few days at work pain has been exacerbated. 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 No fever, no cp, no neck pain, no numbness/tingling.        Review of prior external notes (non-ED) -and- Review of prior external test results outside of this encounter: I reviewed last MRI report of left shoulder with patient [facility] his [facility] with extensive degenerative changes    Prescription drug monitoring program review:     IDA query complete and reviewed. Treatment plan to include limited course of prescribed controlled substance. Risks including addiction, benefits, and alternatives presented to patient.    PAST MEDICAL HISTORY  Active Ambulatory Problems     Diagnosis Date Noted    No Active Ambulatory Problems     Resolved Ambulatory Problems     Diagnosis Date Noted    No Resolved Ambulatory Problems     Past Medical History:   Diagnosis Date    Anxiety     Depression     Diabetes mellitus     Hypertension     PEDRITO (obstructive sleep apnea)     PTSD (post-traumatic stress disorder)          PAST SURGICAL HISTORY  Past Surgical History:   Procedure Laterality Date    KNEE ARTHROSCOPY      VASECTOMY           FAMILY HISTORY  No family history on file.      SOCIAL HISTORY  Social History     Socioeconomic History     Marital status:    Tobacco Use    Smoking status: Former    Smokeless tobacco: Never   Vaping Use    Vaping status: Never Used   Substance and Sexual Activity    Alcohol use: Yes    Drug use: Defer    Sexual activity: Defer         ALLERGIES  Patient has no known allergies.        REVIEW OF SYSTEMS  Review of Systems  Included in HPI  All systems reviewed and negative except for those discussed in HPI.      PHYSICAL EXAM    I have reviewed the triage vital signs and nursing notes.    ED Triage Vitals [11/03/24 1127]   Temp Heart Rate Resp BP SpO2   97.4 °F (36.3 °C) 94 18 151/92 98 %      Temp src Heart Rate Source Patient Position BP Location FiO2 (%)   -- -- -- -- --       Physical Exam  GENERAL: cooperative and conversant M, alert, no acute distress  SKIN: Warm, dry  HENT: Normocephalic, atraumatic  EYES: no scleral icterus  Neck:  no spinous ttp, full rom with no pain reported  RESPIRATORY: relaxed breathing  MUSCULOSKELETAL: no deformity, no deltoid anesthesia on left, ttp at L glenohumeral joint line, no step offs, markedly limited rom secondary to pain  NEURO: alert, moves all extremities, follows commands                                                                   RADIOLOGY  XR Shoulder 2+ View Left    Result Date: 11/3/2024  XR SHOULDER 2+ VW LEFT-  CLINICAL HISTORY:  acute on chronic left shoulder pain with limited mobility but no known new injury; M25.512-Pain in left shoulder  FINDINGS:  3 radiographic views of the left shoulder demonstrate marked osteoarthritic change with joint space narrowing of the glenohumeral joint and subchondral cystic change within the glenoid in addition to marginal osteophyte formation within the humeral head. However, no acute fracture or bony malalignment is appreciated.   This report was finalized on 11/3/2024 12:33 PM by Dr. Roel Cullen M.D on Workstation: [id number]         MEDICATIONS GIVEN IN ER  Medications   predniSONE (DELTASONE) tablet 40 mg (40 mg  Oral Given 11/3/24 1150)         ORDERS PLACED DURING THIS VISIT:  Orders Placed This Encounter   Procedures    XR Shoulder 2+ View Left         OUTPATIENT MEDICATION MANAGEMENT:  No current Epic-ordered facility-administered medications on file.     Current Outpatient Medications Ordered in Epic   Medication Sig Dispense Refill    busPIRone (BUSPAR) 15 MG tablet Take 15 mg by mouth 3 (Three) Times a Day.      cloNIDine (CATAPRES) 0.2 MG tablet Take 0.2 mg by mouth 2 (Two) Times a Day.      cyclobenzaprine (FLEXERIL) 10 MG tablet Take 1 tablet by mouth 3 (Three) Times a Day As Needed for Back Spasms 30 tablet 0    EPINEPHrine (EpiPen 2-Ron) 0.3 MG/0.3ML solution auto-injector injection use as directed for signs of allergic reaction. repeat after 15 minutes if needed 2 each 0    Jardiance 25 MG tablet       lisinopril (PRINIVIL,ZESTRIL) 20 MG tablet Take 20 mg by mouth Daily.      lisinopril-hydrochlorothiazide (PRINZIDE,ZESTORETIC) 20-12.5 MG per tablet Take 1 tablet by mouth Daily.      Melatonin 10 MG tablet Take  by mouth.      metFORMIN (GLUCOPHAGE) 1000 MG tablet Take 1,000 mg by mouth 2 (Two) Times a Day With Meals.      Multiple Vitamin (MULTI-VITAMIN DAILY PO) Take  by mouth.      ondansetron ODT (ZOFRAN-ODT) 4 MG disintegrating tablet Place 1-2 tablets under the tongue Every 8 (Eight) Hours As Needed for nausea/vomiting. 30 tablet 0    oxyCODONE-acetaminophen (PERCOCET) 7.5-325 MG per tablet Take 1 tablet by mouth Every 6 (Six) Hours As Needed for Severe Pain. 12 tablet 0    predniSONE (DELTASONE) 20 MG tablet Take 1 tablet by mouth 2 (Two) Times a Day. 10 tablet 0    SAXagliptin (ONGLYZA) 2.5 MG tablet Take 2.5 mg by mouth Daily.      sertraline (ZOLOFT) 50 MG tablet Take 50 mg by mouth Daily.      telmisartan-hydrochlorothiazide (MICARDIS HCT) 80-12.5 MG per tablet Take 1 tablet by mouth Daily.           PROCEDURES  Procedures            PROGRESS, DATA ANALYSIS, CONSULTS, AND MEDICAL DECISION MAKING  All  labs have been independently interpreted by me.  All radiology studies have been reviewed by me. All EKG's have been independently viewed and interpreted by me.  Discussion below represents my analysis of pertinent findings related to patient's condition, differential diagnosis, treatment plan and final disposition.    Differential diagnosis includes but is not limited to   Pathologic fracture, ac joint separation, subluxation, among other possibilities.                 AS OF 16:25 EST VITALS:    BP - 134/93  HR - 76  TEMP - 97.4 °F (36.3 °C)  O2 SATS - 96%      COMPLEXITY OF CARE  Admission was considered but after careful review of the patient's presentation, physical examination, diagnostic results, and response to treatment the patient may be safely discharged with outpatient follow-up.    DIAGNOSIS  Final diagnoses:   Acute pain of left shoulder         DISPOSITION  ED Disposition       ED Disposition   Discharge    Condition   Stable    Comment   --                Please note that portions of this document were completed with a voice recognition program.    Note Disclaimer: At [facility], we believe that sharing information builds trust and better relationships. You are receiving this note because you recently visited [facility]. It is possible you will see health information before a provider has talked with you about it. This kind of information can be easy to misunderstand. To help you fully understand what it means for your health, we urge you to discuss this note with your provider.         Lona Arevalo MD  11/04/24 0048

## 2024-11-03 NOTE — Clinical Note
[facility] EMERGENCY DEPARTMENT  [address] [facility] [address]  Phone: [phone]    Jose Luis Zelaya was seen and treated in our emergency department on 11/3/2024.  He may return to work on 11/05/2024.         Thank you for choosing [facility].    Lona Arevalo MD

## 2024-11-03 NOTE — ED NOTES
Pt to ED from work. Pt c/o left shoulder pain. Pt has known shoulder issue requiring surgical intervention. Pt reports increase in chronic left shoulder pain since Friday.

## 2024-11-14 ENCOUNTER — PATIENT OUTREACH (OUTPATIENT)
Dept: CASE MANAGEMENT | Facility: OTHER | Age: 49
End: 2024-11-14
Payer: COMMERCIAL

## 2024-11-14 NOTE — OUTREACH NOTE
[facility] Consultation / Initial Visit      Today's Date: 5/26/2023    PCP: Katy Caicedo MD  Referring Provider: Dr. Joanna Harris    INDICATION FOR CONSULTATION: preoperative cardiac evaluation    HPI:       71 year old male with a past medical history of hypertension and DM II who was admitted on 5/24/23 for coffee ground emesis, abdominal pain. Patient states prior to admission he had emesis for 3 days, cough and generalized weakness. His CT abdomen revealed acute cholecystitis to which general surgery evaluated, plans for HIDA scan and request preoperative cardiac evaluation for tentative surgical intervention.    Patient states he is able to achieve 4 METS and go up 2 flights of stairs without exertional limitations or complaints. He states he bowls and is active in the yard with no chest pain. He denies prior history of heart disease, heart failure, stroke, DVT/PE. He had a stress echocardiogram in 2019 with no evidence of ischemia.    The patient denies chest pain, shortness of breath, dizziness, palpitations, PND, orthopnea, or lower extremity edema.     ROS:     General: No fever or chills, No loss of appetite.    RS: No hemoptysis  GI: No melena or hematochezia  : No urinary disturbance  Derm: No skin disorders   Heme: No blood dyscrasias.  Remainder of 12 point systems reviewed and negative (or as mentioned in HPI)    Relevant Past Medical History:  Past Medical History:   Diagnosis Date   • DM (diabetes mellitus) (CMD)    • Erectile dysfunction    • Esophageal reflux    • HTN (hypertension)    • Hyperglycemia    • Hyperlipidemia    • Type 2 diabetes mellitus with diabetic polyneuropathy (CMD)       Past Surgical History:   Procedure Laterality Date   • Fracture surgery     • Oral care          Social History:  Social History     Tobacco Use   Smoking Status Never   Smokeless Tobacco Never     Social History     Substance and Sexual Activity   Alcohol Use Not Currently     History  AMBULATORY CASE MANAGEMENT NOTE    Names and Relationships of Patient/Support Persons: Contact: Jose Luis Zelaya; Relationship: Self -     Education Documentation  Blood Pressure Monitoring, taught by Sheryl Teresa RN at 11/14/2024  2:22 PM.  Learner: Patient  Readiness: Acceptance  Method: Explanation  Response: Verbalizes Understanding    Risk Factors, taught by Sheryl Teresa RN at 11/14/2024  2:22 PM.  Learner: Patient  Readiness: Acceptance  Method: Explanation  Response: Verbalizes Understanding    A1C Goal, taught by Sheryl Teresa RN at 11/14/2024  2:22 PM.  Learner: Patient  Readiness: Acceptance  Method: Explanation  Response: Verbalizes Understanding    Frequency of Testing, taught by Sheryl Teresa RN at 11/14/2024  2:22 PM.  Learner: Patient  Readiness: Acceptance  Method: Explanation  Response: Verbalizes Understanding      Adult Patient Profile  Questions/Answers      Flowsheet Row Most Recent Value   Symptoms/Conditions Managed at Home diabetes, type 2, musculoskeletal, cardiovascular   Barriers to Managing Health none   Cardiovascular Symptoms/Conditions coronary artery disease, high blood cholesterol, hypertension   Cardiovascular Management Strategies medication therapy   Cardiovascular Self-Management Outcome well   Taking Medications Not Prescribed no   Missed Doses of Prescribed Medications During Past Week no   Taken Prescribed Medications at Different Time or Schedule During Past Week no   Taken More or Less Medication Than Prescribed no   Barriers to Taking Medication as Prescribed none   How Well Do You Speak English? very well   Source of Information patient        Adult Wellness Assessment  Questions/Answers      Flowsheet Row Most Recent Value   How often do you have someone help you read facts about your health? never   How often do you have trouble learning about your health because you don't know what the written words mean? never   How confident are you filling out forms by yourself?    Drug Use Unknown       Family History:   Family History   Problem Relation Age of Onset   • Patient is unaware of any medical problems Mother    • Patient is unaware of any medical problems Father    • Diabetes Sister        Inpatient Medications  Current Facility-Administered Medications   Medication Dose Route Frequency Provider Last Rate Last Admin   • insulin lispro (ADMELOG,HumaLOG) - Correction Dose   Subcutaneous TID WC Joanna Harris MD   2 Units at 05/26/23 0805   • aspirin (ECOTRIN) enteric coated tablet 81 mg  81 mg Oral Daily Joanna Harris MD   81 mg at 05/26/23 0805   • atorvastatin (LIPITOR) tablet 20 mg  20 mg Oral QHS Joanna Harris MD   20 mg at 05/25/23 2000   • famotidine (PEPCID) tablet 20 mg  20 mg Oral BID Joanna Harris MD   20 mg at 05/26/23 0805   • sodium chloride (PF) 0.9 % injection 2 mL  2 mL Intracatheter 2 times per day Camilla Germain MD   2 mL at 05/25/23 2000   • cefTRIAXone (ROCEPHIN) syringe 2,000 mg  2,000 mg Intravenous Daily Joanna Harris MD   2,000 mg at 05/26/23 0917   • metroNIDAZOLE (FLAGYL) premix IVPB 500 mg  500 mg Intravenous 3 times per day Joanna Harris  mL/hr at 05/26/23 0550 500 mg at 05/26/23 0550   • Magnesium Standard Replacement Protocol   Does not apply See Admin Instructions Joanna Harris MD       • Potassium Standard Replacement Protocol (Levels 3.5 and lower)   Does not apply See Admin Instructions Joanna Harris MD       • enoxaparin (LOVENOX) injection 40 mg  40 mg Subcutaneous QHS Joanna Harris MD   40 mg at 05/25/23 2000      Current Facility-Administered Medications   Medication Dose Route Frequency Provider Last Rate Last Admin      Current Facility-Administered Medications   Medication Dose Route Frequency Provider Last Rate Last Admin   • dextrose 50 % injection 25 g  25 g Intravenous PRN Joanna Harris MD       • dextrose 50 % injection 12.5 g  12.5 g Intravenous PRN Joanna Harris MD       • glucagon (GLUCAGEN) injection 1 mg  1 mg  always        Send Education  Questions/Answers      Flowsheet Row Most Recent Value   Annual Wellness Visit:  Patient Has Completed   Other Patient Education/Resources  24/7 [facility] Nurse Call Line   24/7 Nurse Call Line Education Method Verbal   Advanced Directives: Not Interested At This Time        Patient Outreach    I contacted Jose Luis after he visited the ed with shoulder pain.  I have introduced the [employer] program and the [place] program for diabetes.  [facility]   We will follow up in a few weeks.      As with any education provided during the call, the patient was reminded to check with their MD before making any changes to their current health regimen.  Educated on availability of nurseline and its use.  All basic needs are met. No issue with social determinants.  No inabilities to obtain food or medications or transportation to MD appointments.  Educated patient on benefits of Employee CM program and invited to call with any new needs.    ENRIQUE SPARROW  Ambulatory Case Management    11/14/2024, 14:25 EST   Intramuscular PRN Joanna Harris MD       • dextrose (GLUTOSE) 40 % gel 15 g  15 g Oral PRN Joanna Harris MD       • dextrose (GLUTOSE) 40 % gel 30 g  30 g Oral PRN Joanna Harris MD       • sodium chloride 0.9 % flush bag 25 mL  25 mL Intravenous PRN Camilla Germain MD       • morphine injection 2 mg  2 mg Intravenous Q4H PRN Joanna Harris MD       • ondansetron (ZOFRAN) injection 4 mg  4 mg Intravenous Q8H PRN Joanna Harris MD       • acetaminophen (TYLENOL) tablet 650 mg  650 mg Oral Q4H PRN Joanna Harris MD       • polyethylene glycol (MIRALAX) packet 17 g  17 g Oral Daily PRN Joanna Harris MD   17 g at 05/26/23 0925   • docusate sodium-sennosides (SENOKOT S) 50-8.6 MG 1 tablet  1 tablet Oral BID PRN Joanna Harris MD       • aluminum-magnesium hydroxide-simethicone (MAALOX) 200-200-20 MG/5ML suspension 30 mL  30 mL Oral Q4H PRN Joanna Harris MD       • sodium chloride 0.9 % flush bag 25 mL  25 mL Intravenous PRN Joanna Harris MD            Allergy   ALLERGIES:  No Known Allergies         Examination:      Vital Last Value 24 Hour Range   Temperature 98.4 °F (36.9 °C) (05/26/23 0736) Temp  Min: 98.2 °F (36.8 °C)  Max: 100.4 °F (38 °C)   Pulse 82 (05/26/23 0736) Pulse  Min: 75  Max: 84   Respiratory 18 (05/26/23 0736) Resp  Min: 16  Max: 18   Non-Invasive  Blood Pressure 93/50 (05/26/23 0736) BP  Min: 93/50  Max: 152/84   Pulse Oximetry 97 % (05/26/23 0736) SpO2  Min: 92 %  Max: 98 %   Arterial   Blood Pressure   No data recorded     Tele: sinus rhythm       Intake/Output Summary (Last 24 hours) at 5/26/2023 1044  Last data filed at 5/26/2023 0810  Gross per 24 hour   Intake 820 ml   Output --   Net 820 ml        Weight    05/24/23 0933 05/24/23 2200   Weight: 87.5 kg (193 lb) 87.5 kg (192 lb 14.4 oz)         GENERAL: No apparent distress  HEENT: Normocephalic.  Neck:  Supple neck.   Oral mucosa : Pink and moist.    Endocrine: There is no goiter.  CVS: Regular rate and rhythm.  Normal first and second heart  tones.   Lung fields: Clear to auscultation bilaterally.   GI: Soft. Nontender, nondistended.    Lower extremity: No cyanosis, clubbing or edema.   Peripheral vascular: Both lower extremities are warm and well perfused.    Neuro: Awake and alert.  Nonfocal examination.  Psych: Appropriate mood and affect  Integumentary: Warm and Dry      Clinical Data:       The following were personally visualized and interpreted by me:    LABS:    CBC  Recent Labs   Lab 05/26/23  0522 05/25/23  0948 05/24/23  0955   WBC 14.6* 14.7* 20.2*   HCT 31.6* 33.5* 39.8   HGB 10.8* 11.5* 13.7    144 224       CMP  Recent Labs   Lab 05/26/23  0522 05/25/23  0948 05/25/23  0512 05/24/23  0955   SODIUM 134* 132*  --  132*  132*   POTASSIUM 3.4 3.1* 3.0* 3.2*  3.2*   CHLORIDE 99 97  --  93*  94*   CO2 25 26  --  26  25   GLUCOSE 225* 283*  --  234*  235*   BUN 14 15  --  18  19   CREATININE 0.78 0.82  --  0.94  0.93   CALCIUM 8.6 8.6  --  9.5  9.6   TOTPROTEIN  --  6.8  --  8.6*  8.7*   ALBUMIN  --  2.4*  --  3.4*  3.5*   BILIRUBIN  --  0.9  --  2.3*  2.3*   AST  --  23  --  17  19   GPT  --  23  --  24  23   ALKPT  --  71  --  79  82       Cardiac Labs  Recent Labs   Lab 05/24/23  0955   HTROPI 5       Lipid Panel  No results found    Coags  Recent Labs   Lab 05/24/23  0955   INR 1.1       ABG  No results found    IMAGING:    ECG:   Encounter Date: 05/24/23   Electrocardiogram 12-Lead   Result Value    Ventricular Rate EKG/Min (BPM) 91    Atrial Rate (BPM) 91    IA-Interval (MSEC) 136    QRS-Interval (MSEC) 96    QT-Interval (MSEC) 356    QTc 438    P Axis (Degrees) 37    R Axis (Degrees) 15    T Axis (Degrees) 36    REPORT TEXT      Normal sinus rhythm  Minimal voltage criteria for LVH, may be normal variant  Borderline ECG  No previous ECGs available  Confirmed by MITALI RECINOS MD ([id number]) on 5/25/2023 5:44:40 PM          Stress Echocardiogram:  11/1/19  1. Stress echo: Left ventricular ejection fraction was normal at rest  and     with stress. There is no evidence for stress-induced ischemia.  2. Procedure narrative: Treadmill exercise testing was performed using the     Jordan protocol. The patient exercised for 7 min 53 sec, to a maximal     work rate of 9mets. Exercise was terminated due to achievement of     target heart rate. Post-stress images obtained within 90 seconds of     peak stress.  3. Stress: Maximal heart rate during stress was 142bpm (93% of maximal     predicted heart rate). The maximal predicted heart rate was 152bpm.The     target heart rate was 129bpm.The target heart rate was achieved. The     heart rate response to stress is normal.      Cath Report:  No results found for this or any previous visit.      Assessment/Plans:     Preoperative cardiac evaluation  -Possible cholecystectomy, pending HIDA scan   -EKG sinus rhythm with no ischemic changes or injury  -Stress echo 2019 with no evidence of ischemia  -CT with coronary calcificaiton  - Pt does > 4 mets of activity on a regular basis without episodes of angina, decompensated heart failure, unstable arrhythmia or significant valvular disease.The patient may proceed with surgery as scheduled as low- moderate risk.    Hypertension  -Borderline hypotensive, antihypertensives held    Diabetes mellitis II  -Recent a1c 6.8%  -On metformin and glipizide per home regimen  -Defer management to primary services      Thank you for allowing us to participate in this patient's care.  Please do not hesitate to call with any questions or concerns.

## 2025-02-10 ENCOUNTER — HOSPITAL ENCOUNTER (OUTPATIENT)
Dept: GENERAL RADIOLOGY | Facility: HOSPITAL | Age: 50
Discharge: HOME | End: 2025-02-10
Payer: COMMERCIAL

## 2025-02-10 ENCOUNTER — PRE-ADMISSION TESTING (OUTPATIENT)
Dept: PREADMISSION TESTING | Facility: HOSPITAL | Age: 50
End: 2025-02-10
Payer: OTHER GOVERNMENT

## 2025-02-10 ENCOUNTER — PATIENT OUTREACH (OUTPATIENT)
Dept: CASE MANAGEMENT | Facility: OTHER | Age: 50
End: 2025-02-10
Payer: OTHER GOVERNMENT

## 2025-02-10 LAB
ALBUMIN SERPL-MCNC: 4.7 G/DL (ref 3.5–5.2)
ALBUMIN/GLOB SERPL: 1.8 G/DL
ALP SERPL-CCNC: 105 U/L (ref 39–117)
ALT SERPL W P-5'-P-CCNC: 40 U/L (ref 1–41)
ANION GAP SERPL CALCULATED.3IONS-SCNC: 8 MMOL/L (ref 5–15)
AST SERPL-CCNC: 26 U/L (ref 1–40)
BILIRUB SERPL-MCNC: 0.3 MG/DL (ref 0–1.2)
BILIRUB UR QL STRIP: NEGATIVE
BUN SERPL-MCNC: 15 MG/DL (ref 6–20)
BUN/CREAT SERPL: 20 (ref 7–25)
CALCIUM SPEC-SCNC: 9.6 MG/DL (ref 8.6–10.5)
CHLORIDE SERPL-SCNC: 101 MMOL/L (ref 98–107)
CLARITY UR: CLEAR
CO2 SERPL-SCNC: 28 MMOL/L (ref 22–29)
COLOR UR: YELLOW
CREAT SERPL-MCNC: 0.75 MG/DL (ref 0.76–1.27)
DEPRECATED RDW RBC AUTO: 42.4 FL (ref 37–54)
EGFRCR SERPLBLD CKD-EPI 2021: 109.9 ML/MIN/1.73
ERYTHROCYTE [DISTWIDTH] IN BLOOD BY AUTOMATED COUNT: 12.3 % (ref 12.3–15.4)
GLOBULIN UR ELPH-MCNC: 2.6 GM/DL
GLUCOSE SERPL-MCNC: 139 MG/DL (ref 65–99)
GLUCOSE UR STRIP-MCNC: ABNORMAL MG/DL
HCT VFR BLD AUTO: 49.9 % (ref 37.5–51)
HGB BLD-MCNC: 16.2 G/DL (ref 13–17.7)
HGB UR QL STRIP.AUTO: NEGATIVE
KETONES UR QL STRIP: NEGATIVE
LEUKOCYTE ESTERASE UR QL STRIP.AUTO: NEGATIVE
MCH RBC QN AUTO: 30.6 PG (ref 26.6–33)
MCHC RBC AUTO-ENTMCNC: 32.5 G/DL (ref 31.5–35.7)
MCV RBC AUTO: 94.3 FL (ref 79–97)
NITRITE UR QL STRIP: NEGATIVE
PH UR STRIP.AUTO: 6.5 [PH] (ref 5–8)
PLATELET # BLD AUTO: 246 10*3/MM3 (ref 140–450)
PMV BLD AUTO: 9.1 FL (ref 6–12)
POTASSIUM SERPL-SCNC: 4.2 MMOL/L (ref 3.5–5.2)
PROT SERPL-MCNC: 7.3 G/DL (ref 6–8.5)
PROT UR QL STRIP: NEGATIVE
RBC # BLD AUTO: 5.29 10*6/MM3 (ref 4.14–5.8)
SODIUM SERPL-SCNC: 137 MMOL/L (ref 136–145)
SP GR UR STRIP: 1.03 (ref 1–1.03)
UROBILINOGEN UR QL STRIP: ABNORMAL
WBC NRBC COR # BLD AUTO: 12.65 10*3/MM3 (ref 3.4–10.8)

## 2025-02-10 PROCEDURE — 71046 X-RAY EXAM CHEST 2 VIEWS: CPT

## 2025-02-10 PROCEDURE — 81003 URINALYSIS AUTO W/O SCOPE: CPT

## 2025-02-10 PROCEDURE — 80053 COMPREHEN METABOLIC PANEL: CPT

## 2025-02-10 PROCEDURE — 85027 COMPLETE CBC AUTOMATED: CPT

## 2025-02-10 PROCEDURE — 73030 X-RAY EXAM OF SHOULDER: CPT

## 2025-02-10 PROCEDURE — 93005 ELECTROCARDIOGRAM TRACING: CPT

## 2025-02-10 PROCEDURE — 36415 COLL VENOUS BLD VENIPUNCTURE: CPT

## 2025-02-10 RX ORDER — ATORVASTATIN CALCIUM 10 MG/1
10 TABLET, FILM COATED ORAL NIGHTLY
COMMUNITY

## 2025-02-10 RX ORDER — ACETAMINOPHEN 500 MG
1000 TABLET ORAL EVERY 6 HOURS PRN
COMMUNITY

## 2025-02-10 RX ORDER — SERTRALINE HYDROCHLORIDE 100 MG/1
200 TABLET, FILM COATED ORAL DAILY
COMMUNITY

## 2025-02-10 RX ORDER — LISINOPRIL AND HYDROCHLOROTHIAZIDE 20; 25 MG/1; MG/1
1 TABLET ORAL DAILY
COMMUNITY

## 2025-02-10 NOTE — OUTREACH NOTE
AMBULATORY CASE MANAGEMENT NOTE    Names and Relationships of Patient/Support Persons: Contact: Jose Luis Zelaya; Relationship: Self -     Jose Luis answered my email and states they are going ahead with surgery on his left shoulder.      ENRIQUE SPARROW  Ambulatory Case Management    2/10/2025, 16:15 EST

## 2025-02-10 NOTE — DISCHARGE INSTRUCTIONS
Take the following medications the morning of surgery:    Buspar   clonidine   zoloft    If you are on prescription narcotic pain medication to control your pain you may also take that medication the morning of surgery.      General Instructions:     Do not eat solid food after midnight the night before surgery.  Clear liquids day of surgery are allowed but must be stopped at least two hours before your hospital arrival time.       Allowed clear liquids      Water, sodas, and tea or coffee with no cream or milk added.       12 to 20 ounces of a clear liquid that contains carbohydrates is recommended.  If non-diabetic, have Gatorade or Powerade.  If diabetic, have G2 or Powerade Zero.     Do not have liquids red in color.  Do not consume chicken, beef, pork or vegetable broth or bouillon cubes of any variety as they are not considered clear liquids and are not allowed.      Infants may have breast milk up to four hours before surgery.  Infants drinking formula may drink formula up to six hours before surgery.   Patients who avoid smoking, chewing tobacco and alcohol for 4 weeks prior to surgery have a reduced risk of post-operative complications.  Quit smoking as many days before surgery as you can.  Do not smoke, use chewing tobacco or drink alcohol the day of surgery.   If applicable bring your C-PAP/ BI-PAP machine in with you to preop day of surgery.  Bring any papers given to you in the doctor’s office.  Wear clean comfortable clothes.  Do not wear contact lenses, false eyelashes or make-up.  Bring a case for your glasses.   Bring crutches or walker if applicable.  Remove all piercings.  Leave jewelry and any other valuables at home.  Hair extensions with metal clips must be removed prior to surgery.  The Pre-Admission Testing nurse will instruct you to bring medications if unable to obtain an accurate list in Pre-Admission Testing.        If you were given a blood bank ID arm band remember to bring it with you  the day of surgery.    Day of surgery:2/20/2025  Your arrival time is approximately two hours before your scheduled surgery time.  Upon arrival, a Pre-op nurse and Anesthesiologist will review your health history, obtain vital signs, and answer questions you may have.  The only belongings needed at this time will be a list of your home medications and if applicable your C-PAP/BI-PAP machine.  A Pre-op nurse will start an IV and you may receive medication in preparation for surgery, including something to help you relax.     Please be aware that surgery does come with discomfort.  We want to make every effort to control your discomfort so please discuss any uncontrolled symptoms with your nurse.   Your doctor will most likely have prescribed pain medications.      If you are going home after surgery you will receive individualized written care instructions before being discharged.  A responsible adult must drive you to and from the hospital on the day of your surgery and ideally stay with you through the night.  Discharge prescriptions can be filled by the hospital pharmacy during regular pharmacy hours.  If you are having surgery late in the day/evening your prescription may be e-prescribed to your pharmacy.  Please verify your pharmacy hours or chose a 24 hour pharmacy to avoid not having access to your prescription because your pharmacy has closed for the day.    If you are staying overnight following surgery, you will be transported to your hospital room following the recovery period.  [facility] has all private rooms.    If you have any questions please call Pre-Admission Testing at [phone].  Deductibles and co-payments are collected on the day of service. Please be prepared to pay the required co-pay, deductible or deposit on the day of service as defined by your plan.    Call your surgeon immediately if you experience any of the following symptoms:  Sore Throat  Shortness of Breath or  difficulty breathing  Cough  Chills  Body soreness or muscle pain  Headache  Fever  New loss of taste or smell  Do not arrive for your surgery ill.  Your procedure will need to be rescheduled to another time.  You will need to call your physician before the day of surgery to avoid any unnecessary exposure to hospital staff as well as other patients.        PREVENTING INFECTION IN SHOULDER SURGICAL SITES     C. acnes is a bacteria that lives deep within follicles and pores of the skin. It is found in large numbers on the skin of the face, axilla (armpit), chest and back and is the primary bacteria to cause a surgical site infection after shoulder surgery.      Use of a Benzoyl Peroxide solution prior to shoulder surgery decreases C. acnes and reduces post-op infections.   Your surgeon has ordered 5% Benzoyl Peroxide wash to be used three times prior to your surgery.     Please read the following instructions carefully and bring this form with you the day of surgery.     General bathing instructions starting two days before your surgery:    Shower using a fresh bar of anti-bacterial soap (such as Dial) and clean washcloth.  Pay special attention to the neck, shoulder and armpit area.   Wash your hair as usual with your regular shampoo.   Rinse hair and body thoroughly with warm water (not hot water) to remove shampoo and residue.   Dry with a clean towel.              Sleep in a clean bed with clean clothing.  Do not allow pets to sleep with you.     For 2 days before surgery, avoid shaving with a razor because the razor can irritate skin and make it easier to develop an infection.    Any areas of open skin can increase the risk of a post-operative wound infection by allowing bacteria to enter and travel throughout the body.  Notify your surgeon if you have any skin wounds / rashes even if it is not near the expected surgical site.  The area will need assessed to determine if surgery should be delayed until it is  healed.      First application of 5% Benzoyl Peroxide Wash two nights before surgery:                                                                Wash neck, shoulder (front, back, side) and armpit   with warm water, rinse and dry - see picture.  Gently wash the same areas with the Benzoyl Peroxide   cleanser going away from the neck for 10-20 seconds.   Work into a full lather and leave on the skin for   2 minutes for greatest effect.  Rinse thoroughly with warm water, not hot water.                     Pat dry with a clean towel.                                                            Wash your hands thoroughly.  Do not apply lotion, powder, perfume or deodorant.   Put on clean clothes.    Second application the night before surgery:  Repeat the above steps.        Third application morning of surgery:  Repeat the above steps.    Due to shoulder pain or decreased range of motion of your shoulder and arm, you may need assistance washing under the arm or the back portion of your shoulder.     Avoid further washing the areas of the skin treated with Benzoyl Peroxide for at least 1 hour.    For your convenience, you may purchase Benzoyl Peroxide at [facility] retail pharmacy.     Warning:  Let your physician know if you are allergic to Benzoyl Peroxide or have very sensitive skin and cannot use it.   Stop using and contact your surgeon if you experience any excessive scaling, itching, swelling, skin irritation or other signs of a reaction.  Keep out of eyes, ears, nose and mouth.  Do not apply to sunburned, irritated or broken area on the skin.  Avoid unwanted problems with bleaching effect by following these tips:  Wash hands after each use.  Avoid contact with hair, clothing, furnishings or carpeting.  Wear clean, old t-shirt or clothing to bed.   Use clean, old white pillow cases and sheets to avoid discoloring your bed linens.                                                                                                                                                                                                                                                                                    Please complete the checklist below, bring it with you to the hospital                               the day of surgery and give to the Pre-op Nurse     Preoperative Skin Prep Checklist        Patient Name Label             Enter dates and ?  boxes to indicate completed    Surgery Date: _______________ Regular Shower  Benzoyl Peroxide Wash   First Application:  2 days before_______________  (Stop shaving all body parts)           Morning or Evening                        Evening    Second Application:  1 day  before________________        Morning or Evening                          Evening   Third Application:  Day of Surgery______________                           Morning                   Morning

## 2025-02-11 LAB
QT INTERVAL: 386 MS
QTC INTERVAL: 454 MS

## 2025-02-13 ENCOUNTER — TELEPHONE (OUTPATIENT)
Dept: ORTHOPEDIC SURGERY | Facility: HOSPITAL | Age: 50
End: 2025-02-13
Payer: OTHER GOVERNMENT

## 2025-02-13 NOTE — TELEPHONE ENCOUNTER
Risk Factor yes no   Age >75  X   BMI <20 >40  X   Patient History     Chronic Pain (2 or more meds/Pain Management)  X   ETOH (more than 3 drinks Daily)  X   Uncontrolled Depression/Bipolar/Schizoaffective Disorder  X   Arrhythmias--  X   Stent placement/MI  X   DVT/PE  X   Pacemaker  X   HTN (uncontrolled or requiring more than 2 medications)  X   CHF/Retained fluids/Edema  X   Stroke with Residual   X   COPD/Asthma  X   PEDRITO--Non-compliant with CPAP  X   Diabetes (on insulin or more than 2 meds)         A1C:  X   BPH/Urinary retention (on medication)  X   CKD  X   Home environment and support     Current ambulation status (use of cane, walker, W/C, Multiple falls/weakness)  X   Stairs to enter and throughout home X    Lives Alone  X   Doesn't have support at home  X   Outpatient Screening Assessment    Home needs: (Walker/BSC):  Total Shoulder  ? Steps 2 steps    Caregiver 24-48hrs post-discharge: Wife and son    Discharge Plan:   Total Shoulder     Prescriptions: Meds to bed    Home medications:   [] Blood thinner/anti-coag therapy--   [] BPH or diuretic--  ? BP meds-- Clonidine, Lisinopril/HCTZ  [] Pain/Anti-inflammatories--   Dm--Jardiance, Metformin  Pre-op Education:  Educate patient on spinal anesthesia/pain control:  ? patient verbalize understanding    Educate patient on hospital course/timeline:  ?  patient verbalize understanding    Joint Care Class:  ?  yes [] no  Notes:   WBC's 12.99

## 2025-02-17 NOTE — DISCHARGE INSTRUCTIONS
Dr. Juan Sweet  [address] [address]  [phone]    Outpatient REVERSE TOTAL SHOULDER REPLACEMENT  HOSPITAL DISCHARGE INSTRUCTIONS     GENERAL INFORMATION: With improved surgical techniques for total shoulder and reverse total shoulder replacement and the [employer] of ultrasound guided long acting nerve blocks, the hospital stay for patients has decreased significantly.  Many people can go home right after surgery as an outpatient.    SPECIFIC POST-OP INSTRUCTIONS:  * FOLLOW UP APPOINTMENT: You should have been given an appointment to follow up 10-14 days after your date of surgery. We can see you back sooner if there are any problems or concerns.   * BLOOD THINNERS: All patients will be on some type of blood thinner post-op to prevent blood clot. Most patients who are not already on a blood thinner are discharged on over-the-counter aspirin 81 mg or 325mg daily which you will take for three weeks. If you were taking a blood thinner prior to surgery, we will have you resume this on the first day post-op.   * ICE: Ice helps to decrease both pain and swelling. Ice should be applied to the shoulder 20-25 minutes each hour while awake.    DRESSING CHANGES: We ask that you keep the incision clean and dry.  Your surgical dressing can be removed 48 hours after surgery.  There will be a yellow strip of gauze and a Zipline device that will be underneath the dressing.  The yellow gauze can be removed but leave the Zipline alone.  You can then apply a watertight dressing over the Zipline to protect it.  You can get this type of dressing from the hospital before you leave or at your local pharmacy. SHOWERING: The wound edges typically come together and seal by 3-5 days post-op if there is no drainage. Again, please keep the same waterproof dressing on. DO NOT SUBMERSE SHOULDER IN POOL,HOT TUB OR BATH UNTIL AT LEAST 3-4 WEEKS POST-OP (EVEN WITH WATERPROOF BANDAIDS).  * PAIN  MEDICINE: You will be given a prescription  for oral pain medication prior to discharge from the hospital. Please let us know if you have a sensitivity to certain pain medications prior to discharge. Additional pain medication prescriptions can be called into your pharmacy during normal business hours. NO medications can be called in over the weekends or after business hours.   * ORAL ANTI-INFLAMMATORIES:   You will be asked to discontinue ALL oral anti-inflammatories prior to surgery. You can resume these the first day post-op.These can be combined with the oral pain medications safely. DO NOT TAKE ADDITIONAL TYLENOL WITH THE NARCOTIC PAIN MEDICATION (it already has Tylenol in it). If you were not taking an anti-inflammatory pre-op, you can start one on the first day post-op. It will help decrease pain and swelling. Typical medications and dosages are as follows:   Advil/Motrin/Ibuprofen 200mg, 4 pills every 8 hours   Aleve/Naproxen Sodium 220mg, 2 pills every 12 hours  * SLING: We recommend you wear the sling at all times, other than when showering or doing physical therapy exercises.    * WEIGHT BEARING: We ask that you be non-weight bearing on the operative shoulder. Do not use the operative arm to lift/push/pull greater than 5lbs.   * PHYSICAL THERAPY: Before you leave the hospital staff will teach you some very gentle range of motion exercises to do at home for the first 10-14 days. After we see you in the office during your first post-op visit, we will give you a prescription to start formal physical therapy. This can be done downstairs at [facility] or at a location of your choice. Physical therapy is typically 2-3 times a week for 4-6 weeks, depending on the individual and their progress.   * DRIVING A CAR: Medically/legally we can not recommend you drive a car while in the sling or while on pain medication (roughly 10-14 days).   * RETURNING TO DAILY AND RECREATIONAL ACTIVITIES: For the most part patients can progress to daily activities as tolerated  "(keeping in mind the restrictions listed above). Initially, we do not want you to \"overdo\" it in an attempt to minimize post-op pain and swelling. Once the swelling is controlled, you can progress with activities as tolerated. Pain and swelling should be your guide to increasing your activity level.   * RETURN TO WORK: The return to work date depends on many factors and is very dependent on the individual. You would most likely be able to return to a sedentary job after your first post-op visit (10-14 days after surgery). A more physical job would obviously require a longer recovery time before return to work.        You had a pain pill ( percocet) at 1248    What to expect after a Nerve Block    Nerve blocks administered to block pain affect many types of nerves, including those nerves that control movement, pain, and normal sensation. Following a nerve block, you may notice some bruising at the site where the block was given. You may experience sensations such as: numbness of the affected area or limb, tingling, heaviness (that is the limb feels heavy to you), weakness or inability to move the affected arm or leg, or a feeling as if your arm or leg has “fallen asleep.”     A nerve block can last from 2 to 36 hours depending on the medications used.  Usually the weakness wears off first followed by the tingling and heaviness. As the block wears off, you may begin to notice pain; however, this sequence of events may occur in any order. Typically, you will be able to move your limb before you will feel it. Once a nerve block begins to wear off, the effects are usually completely gone within 60 minutes.  If you experience continued side effects that you believe are block related for longer than 48 hours, please call your healthcare provider. Please see block-specific instructions below.    Instructions for any block involving the shoulder or arm  If you have had any kind of shoulder/arm block, you will go home with " your arm in a sling. Wear the sling until the block has completely worn off. You may be required to wear it for a longer period of time per your surgeon’s recommendations.  If you have had a shoulder/arm block, it is a good idea to sleep on a recliner with pillows under your arm.    You may experience symptoms such as:  Shortness of breath  Hoarseness   Blurry vision  Unequal pupils  Drooping of your face on the same side as the block was performed    These are side effects associated with this kind of block and should go away within 12 hours.    Note: If you have severe or prolonged shortness of breath, please seek medical assistance as soon as possible.     Protection of a “blocked” arm or leg (limb)  After a nerve block, you cannot feel pain, pressure, or extremes of temperature in the affected limb. And because of this, your blocked limb is at more risk for injury. For example, it is possible to burn your limb on an extremely hot surface without feeling it.     When resting, it is important to reposition your limb periodically to avoid prolonged pressure on it. This may require the use of pillows and padding.    While sleeping, you should avoid rolling onto the affected limb or putting too much pressure on it.     If you have a cast or tight dressing, check the color of your fingers or toes of the affected limb. Call your surgeon if they look discolored (that is, dusky, dark colored).    Use caution in cold weather. Cover your limb appropriately to protect it from the cold.      Pain Management:    Your surgeon will give you a prescription for pain medication. Begin taking this before the nerve block wears off. Bear in mind that sometimes the block can wear off in the middle of the night.

## 2025-02-20 ENCOUNTER — ANESTHESIA EVENT (OUTPATIENT)
Dept: PERIOP | Facility: HOSPITAL | Age: 50
End: 2025-02-20
Payer: OTHER GOVERNMENT

## 2025-02-20 ENCOUNTER — APPOINTMENT (OUTPATIENT)
Dept: GENERAL RADIOLOGY | Facility: HOSPITAL | Age: 50
End: 2025-02-20
Payer: OTHER GOVERNMENT

## 2025-02-20 ENCOUNTER — ANESTHESIA (OUTPATIENT)
Dept: PERIOP | Facility: HOSPITAL | Age: 50
End: 2025-02-20
Payer: OTHER GOVERNMENT

## 2025-02-20 ENCOUNTER — HOSPITAL ENCOUNTER (OUTPATIENT)
Facility: HOSPITAL | Age: 50
Setting detail: HOSPITAL OUTPATIENT SURGERY
Discharge: HOME | End: 2025-02-20
Attending: ORTHOPAEDIC SURGERY | Admitting: ORTHOPAEDIC SURGERY
Payer: OTHER GOVERNMENT

## 2025-02-20 VITALS
WEIGHT: 255.29 LBS | RESPIRATION RATE: 18 BRPM | HEART RATE: 97 BPM | OXYGEN SATURATION: 94 % | SYSTOLIC BLOOD PRESSURE: 128 MMHG | TEMPERATURE: 98.5 F | HEIGHT: 69 IN | BODY MASS INDEX: 37.81 KG/M2 | DIASTOLIC BLOOD PRESSURE: 81 MMHG

## 2025-02-20 DIAGNOSIS — Z96.612 STATUS POST TOTAL SHOULDER ARTHROPLASTY, LEFT: Primary | ICD-10-CM

## 2025-02-20 LAB
GLUCOSE BLDC GLUCOMTR-MCNC: 187 MG/DL (ref 70–130)
GLUCOSE BLDC GLUCOMTR-MCNC: 208 MG/DL (ref 70–130)
GLUCOSE BLDC GLUCOMTR-MCNC: 227 MG/DL (ref 70–130)

## 2025-02-20 PROCEDURE — 97165 OT EVAL LOW COMPLEX 30 MIN: CPT

## 2025-02-20 PROCEDURE — 25810000003 LACTATED RINGERS PER 1000 ML: Performed by: ANESTHESIOLOGY

## 2025-02-20 PROCEDURE — 25010000002 MIDAZOLAM PER 1 MG: Performed by: ANESTHESIOLOGY

## 2025-02-20 PROCEDURE — 25010000002 ONDANSETRON PER 1 MG: Performed by: NURSE ANESTHETIST, CERTIFIED REGISTERED

## 2025-02-20 PROCEDURE — C1776 JOINT DEVICE (IMPLANTABLE): HCPCS | Performed by: ORTHOPAEDIC SURGERY

## 2025-02-20 PROCEDURE — 25010000002 HYDROMORPHONE PER 4 MG: Performed by: NURSE ANESTHETIST, CERTIFIED REGISTERED

## 2025-02-20 PROCEDURE — 73020 X-RAY EXAM OF SHOULDER: CPT

## 2025-02-20 PROCEDURE — C1713 ANCHOR/SCREW BN/BN,TIS/BN: HCPCS | Performed by: ORTHOPAEDIC SURGERY

## 2025-02-20 PROCEDURE — 97535 SELF CARE MNGMENT TRAINING: CPT

## 2025-02-20 PROCEDURE — 25010000002 PROPOFOL 10 MG/ML EMULSION: Performed by: NURSE ANESTHETIST, CERTIFIED REGISTERED

## 2025-02-20 PROCEDURE — 82948 REAGENT STRIP/BLOOD GLUCOSE: CPT

## 2025-02-20 PROCEDURE — 25010000002 BUPIVACAINE LIPOSOME 1.3 % SUSPENSION: Performed by: ANESTHESIOLOGY

## 2025-02-20 PROCEDURE — 25010000002 BUPIVACAINE (PF) 0.5 % SOLUTION: Performed by: ANESTHESIOLOGY

## 2025-02-20 PROCEDURE — 25010000002 CEFAZOLIN PER 500 MG: Performed by: ORTHOPAEDIC SURGERY

## 2025-02-20 PROCEDURE — 25010000002 LIDOCAINE 1 % SOLUTION: Performed by: ANESTHESIOLOGY

## 2025-02-20 PROCEDURE — 25010000002 SUGAMMADEX 200 MG/2ML SOLUTION: Performed by: NURSE ANESTHETIST, CERTIFIED REGISTERED

## 2025-02-20 PROCEDURE — 25010000002 LIDOCAINE PF 2% 2 % SOLUTION: Performed by: NURSE ANESTHETIST, CERTIFIED REGISTERED

## 2025-02-20 DEVICE — SUT NONABS MAXBRAID/PE NMBR2 C7 38IN BLU 900335: Type: IMPLANTABLE DEVICE | Site: SHOULDER | Status: FUNCTIONAL

## 2025-02-20 DEVICE — IMPLANTABLE DEVICE: Type: IMPLANTABLE DEVICE | Site: SHOULDER | Status: FUNCTIONAL

## 2025-02-20 DEVICE — TOTL SHLDR NO STEM AUG: Type: IMPLANTABLE DEVICE | Status: FUNCTIONAL

## 2025-02-20 DEVICE — CMT BONE PALACOS R HI/VISC 1X40: Type: IMPLANTABLE DEVICE | Site: SHOULDER | Status: FUNCTIONAL

## 2025-02-20 RX ORDER — ASPIRIN 81 MG/1
81 TABLET ORAL DAILY
Status: CANCELLED | OUTPATIENT
Start: 2025-02-21

## 2025-02-20 RX ORDER — ONDANSETRON 4 MG/1
4 TABLET, FILM COATED ORAL EVERY 4 HOURS PRN
Qty: 30 TABLET | Refills: 0 | Status: SHIPPED | OUTPATIENT
Start: 2025-02-20

## 2025-02-20 RX ORDER — OXYCODONE AND ACETAMINOPHEN 5; 325 MG/1; MG/1
1 TABLET ORAL EVERY 4 HOURS PRN
Qty: 30 TABLET | Refills: 0 | Status: SHIPPED | OUTPATIENT
Start: 2025-02-20

## 2025-02-20 RX ORDER — SODIUM CHLORIDE 0.9 % (FLUSH) 0.9 %
3 SYRINGE (ML) INJECTION EVERY 12 HOURS SCHEDULED
Status: DISCONTINUED | OUTPATIENT
Start: 2025-02-20 | End: 2025-02-20 | Stop reason: HOSPADM

## 2025-02-20 RX ORDER — NALOXONE HCL 0.4 MG/ML
0.2 VIAL (ML) INJECTION AS NEEDED
Status: DISCONTINUED | OUTPATIENT
Start: 2025-02-20 | End: 2025-02-20 | Stop reason: HOSPADM

## 2025-02-20 RX ORDER — OXYCODONE AND ACETAMINOPHEN 5; 325 MG/1; MG/1
1 TABLET ORAL EVERY 4 HOURS PRN
Status: CANCELLED | OUTPATIENT
Start: 2025-02-20 | End: 2025-02-25

## 2025-02-20 RX ORDER — PROPOFOL 10 MG/ML
VIAL (ML) INTRAVENOUS AS NEEDED
Status: DISCONTINUED | OUTPATIENT
Start: 2025-02-20 | End: 2025-02-20 | Stop reason: SURG

## 2025-02-20 RX ORDER — HYDROMORPHONE HYDROCHLORIDE 1 MG/ML
0.5 INJECTION, SOLUTION INTRAMUSCULAR; INTRAVENOUS; SUBCUTANEOUS
Status: DISCONTINUED | OUTPATIENT
Start: 2025-02-20 | End: 2025-02-20 | Stop reason: HOSPADM

## 2025-02-20 RX ORDER — MIDAZOLAM HYDROCHLORIDE 1 MG/ML
1 INJECTION, SOLUTION INTRAMUSCULAR; INTRAVENOUS
Status: DISCONTINUED | OUTPATIENT
Start: 2025-02-20 | End: 2025-02-20 | Stop reason: HOSPADM

## 2025-02-20 RX ORDER — LABETALOL HYDROCHLORIDE 5 MG/ML
5 INJECTION, SOLUTION INTRAVENOUS
Status: DISCONTINUED | OUTPATIENT
Start: 2025-02-20 | End: 2025-02-20 | Stop reason: HOSPADM

## 2025-02-20 RX ORDER — BUPIVACAINE HYDROCHLORIDE 5 MG/ML
INJECTION, SOLUTION EPIDURAL; INTRACAUDAL
Status: COMPLETED | OUTPATIENT
Start: 2025-02-20 | End: 2025-02-20

## 2025-02-20 RX ORDER — ROCURONIUM BROMIDE 10 MG/ML
INJECTION, SOLUTION INTRAVENOUS AS NEEDED
Status: DISCONTINUED | OUTPATIENT
Start: 2025-02-20 | End: 2025-02-20 | Stop reason: SURG

## 2025-02-20 RX ORDER — PROMETHAZINE HYDROCHLORIDE 25 MG/1
25 SUPPOSITORY RECTAL ONCE AS NEEDED
Status: DISCONTINUED | OUTPATIENT
Start: 2025-02-20 | End: 2025-02-20 | Stop reason: HOSPADM

## 2025-02-20 RX ORDER — HYDROCODONE BITARTRATE AND ACETAMINOPHEN 5; 325 MG/1; MG/1
1 TABLET ORAL ONCE AS NEEDED
Status: DISCONTINUED | OUTPATIENT
Start: 2025-02-20 | End: 2025-02-20 | Stop reason: HOSPADM

## 2025-02-20 RX ORDER — MAGNESIUM HYDROXIDE 1200 MG/15ML
LIQUID ORAL AS NEEDED
Status: DISCONTINUED | OUTPATIENT
Start: 2025-02-20 | End: 2025-02-20 | Stop reason: HOSPADM

## 2025-02-20 RX ORDER — ATROPINE SULFATE 0.4 MG/ML
0.4 INJECTION, SOLUTION INTRAMUSCULAR; INTRAVENOUS; SUBCUTANEOUS ONCE AS NEEDED
Status: DISCONTINUED | OUTPATIENT
Start: 2025-02-20 | End: 2025-02-20 | Stop reason: HOSPADM

## 2025-02-20 RX ORDER — LIDOCAINE HYDROCHLORIDE 20 MG/ML
INJECTION, SOLUTION EPIDURAL; INFILTRATION; INTRACAUDAL; PERINEURAL AS NEEDED
Status: DISCONTINUED | OUTPATIENT
Start: 2025-02-20 | End: 2025-02-20 | Stop reason: SURG

## 2025-02-20 RX ORDER — IPRATROPIUM BROMIDE AND ALBUTEROL SULFATE 2.5; .5 MG/3ML; MG/3ML
3 SOLUTION RESPIRATORY (INHALATION) ONCE AS NEEDED
Status: DISCONTINUED | OUTPATIENT
Start: 2025-02-20 | End: 2025-02-20 | Stop reason: HOSPADM

## 2025-02-20 RX ORDER — DIPHENHYDRAMINE HYDROCHLORIDE 50 MG/ML
12.5 INJECTION INTRAMUSCULAR; INTRAVENOUS
Status: DISCONTINUED | OUTPATIENT
Start: 2025-02-20 | End: 2025-02-20 | Stop reason: HOSPADM

## 2025-02-20 RX ORDER — SODIUM CHLORIDE 0.9 % (FLUSH) 0.9 %
3-10 SYRINGE (ML) INJECTION AS NEEDED
Status: DISCONTINUED | OUTPATIENT
Start: 2025-02-20 | End: 2025-02-20 | Stop reason: HOSPADM

## 2025-02-20 RX ORDER — OXYCODONE AND ACETAMINOPHEN 7.5; 325 MG/1; MG/1
1 TABLET ORAL EVERY 4 HOURS PRN
Status: DISCONTINUED | OUTPATIENT
Start: 2025-02-20 | End: 2025-02-20 | Stop reason: HOSPADM

## 2025-02-20 RX ORDER — FENTANYL CITRATE 50 UG/ML
50 INJECTION, SOLUTION INTRAMUSCULAR; INTRAVENOUS
Status: DISCONTINUED | OUTPATIENT
Start: 2025-02-20 | End: 2025-02-20 | Stop reason: HOSPADM

## 2025-02-20 RX ORDER — ASPIRIN 81 MG/1
81 TABLET ORAL DAILY
Qty: 30 TABLET | Refills: 0 | Status: SHIPPED | OUTPATIENT
Start: 2025-02-20

## 2025-02-20 RX ORDER — LIDOCAINE HYDROCHLORIDE 10 MG/ML
0.5 INJECTION, SOLUTION INFILTRATION; PERINEURAL ONCE AS NEEDED
Status: COMPLETED | OUTPATIENT
Start: 2025-02-20 | End: 2025-02-20

## 2025-02-20 RX ORDER — TRANEXAMIC ACID 100 MG/ML
INJECTION, SOLUTION INTRAVENOUS AS NEEDED
Status: DISCONTINUED | OUTPATIENT
Start: 2025-02-20 | End: 2025-02-20 | Stop reason: SURG

## 2025-02-20 RX ORDER — HYDRALAZINE HYDROCHLORIDE 20 MG/ML
5 INJECTION INTRAMUSCULAR; INTRAVENOUS
Status: DISCONTINUED | OUTPATIENT
Start: 2025-02-20 | End: 2025-02-20 | Stop reason: HOSPADM

## 2025-02-20 RX ORDER — SODIUM CHLORIDE, SODIUM LACTATE, POTASSIUM CHLORIDE, CALCIUM CHLORIDE 600; 310; 30; 20 MG/100ML; MG/100ML; MG/100ML; MG/100ML
9 INJECTION, SOLUTION INTRAVENOUS CONTINUOUS
Status: DISCONTINUED | OUTPATIENT
Start: 2025-02-20 | End: 2025-02-20 | Stop reason: HOSPADM

## 2025-02-20 RX ORDER — ONDANSETRON 2 MG/ML
4 INJECTION INTRAMUSCULAR; INTRAVENOUS ONCE AS NEEDED
Status: DISCONTINUED | OUTPATIENT
Start: 2025-02-20 | End: 2025-02-20 | Stop reason: HOSPADM

## 2025-02-20 RX ORDER — PROMETHAZINE HYDROCHLORIDE 25 MG/1
25 TABLET ORAL ONCE AS NEEDED
Status: DISCONTINUED | OUTPATIENT
Start: 2025-02-20 | End: 2025-02-20 | Stop reason: HOSPADM

## 2025-02-20 RX ORDER — FLUMAZENIL 0.1 MG/ML
0.2 INJECTION INTRAVENOUS AS NEEDED
Status: DISCONTINUED | OUTPATIENT
Start: 2025-02-20 | End: 2025-02-20 | Stop reason: HOSPADM

## 2025-02-20 RX ORDER — ONDANSETRON 2 MG/ML
INJECTION INTRAMUSCULAR; INTRAVENOUS AS NEEDED
Status: DISCONTINUED | OUTPATIENT
Start: 2025-02-20 | End: 2025-02-20 | Stop reason: SURG

## 2025-02-20 RX ORDER — EPHEDRINE SULFATE 50 MG/ML
5 INJECTION, SOLUTION INTRAVENOUS ONCE AS NEEDED
Status: DISCONTINUED | OUTPATIENT
Start: 2025-02-20 | End: 2025-02-20 | Stop reason: HOSPADM

## 2025-02-20 RX ORDER — FAMOTIDINE 10 MG/ML
20 INJECTION, SOLUTION INTRAVENOUS ONCE
Status: COMPLETED | OUTPATIENT
Start: 2025-02-20 | End: 2025-02-20

## 2025-02-20 RX ADMIN — LIDOCAINE HYDROCHLORIDE 0.5 ML: 10 INJECTION, SOLUTION INFILTRATION; PERINEURAL at 08:00

## 2025-02-20 RX ADMIN — HYDROMORPHONE HYDROCHLORIDE 0.5 MG: 1 INJECTION, SOLUTION INTRAMUSCULAR; INTRAVENOUS; SUBCUTANEOUS at 12:17

## 2025-02-20 RX ADMIN — FAMOTIDINE 20 MG: 10 INJECTION INTRAVENOUS at 09:06

## 2025-02-20 RX ADMIN — SUGAMMADEX 200 MG: 100 INJECTION, SOLUTION INTRAVENOUS at 11:43

## 2025-02-20 RX ADMIN — ROCURONIUM BROMIDE 50 MG: 10 INJECTION, SOLUTION INTRAVENOUS at 10:11

## 2025-02-20 RX ADMIN — MIDAZOLAM HYDROCHLORIDE 1 MG: 1 INJECTION, SOLUTION INTRAMUSCULAR; INTRAVENOUS at 08:58

## 2025-02-20 RX ADMIN — ROCURONIUM BROMIDE 30 MG: 10 INJECTION, SOLUTION INTRAVENOUS at 11:00

## 2025-02-20 RX ADMIN — CEFAZOLIN 2000 MG: 2 INJECTION, POWDER, FOR SOLUTION INTRAMUSCULAR; INTRAVENOUS at 10:01

## 2025-02-20 RX ADMIN — OXYCODONE HYDROCHLORIDE AND ACETAMINOPHEN 1 TABLET: 7.5; 325 TABLET ORAL at 12:48

## 2025-02-20 RX ADMIN — BUPIVACAINE 10 ML: 13.3 INJECTION, SUSPENSION, LIPOSOMAL INFILTRATION at 09:06

## 2025-02-20 RX ADMIN — BUPIVACAINE HYDROCHLORIDE 10 ML: 5 INJECTION, SOLUTION EPIDURAL; INTRACAUDAL; PERINEURAL at 09:06

## 2025-02-20 RX ADMIN — PROPOFOL 200 MG: 10 INJECTION, EMULSION INTRAVENOUS at 10:11

## 2025-02-20 RX ADMIN — SODIUM CHLORIDE, POTASSIUM CHLORIDE, SODIUM LACTATE AND CALCIUM CHLORIDE 9 ML/HR: 600; 310; 30; 20 INJECTION, SOLUTION INTRAVENOUS at 08:00

## 2025-02-20 RX ADMIN — TRANEXAMIC ACID 1000 MG: 100 INJECTION, SOLUTION INTRAVENOUS at 10:25

## 2025-02-20 RX ADMIN — ONDANSETRON 4 MG: 2 INJECTION, SOLUTION INTRAMUSCULAR; INTRAVENOUS at 11:43

## 2025-02-20 RX ADMIN — LIDOCAINE HYDROCHLORIDE 100 MG: 20 INJECTION, SOLUTION EPIDURAL; INFILTRATION; INTRACAUDAL; PERINEURAL at 10:11

## 2025-02-20 NOTE — ANESTHESIA POSTPROCEDURE EVALUATION
Patient: Jose Luis Zelaya    Procedure Summary       Date: 02/20/25 Room / Location:  [facility] [address] [place] [address] [facility]    Anesthesia Start: 1005 Anesthesia Stop: 1155    Procedure: LEFT TOTAL SHOULDER ARTHROPLASTY (Left: Shoulder) Diagnosis:     Surgeons: Juan Sweet MD Provider: Mary Hernandez MD    Anesthesia Type: general ASA Status: 2            Anesthesia Type: general    Vitals  Vitals Value Taken Time   /81 02/20/25 1300   Temp 36.9 °C (98.5 °F) 02/20/25 1300   Pulse 100 02/20/25 1307   Resp 16 02/20/25 1300   SpO2 94 % 02/20/25 1307   Vitals shown include unfiled device data.        Post Anesthesia Care and Evaluation    Patient location during evaluation: bedside  Patient participation: complete - patient participated  Level of consciousness: awake and alert  Pain management: adequate    Airway patency: patent  Anesthetic complications: No anesthetic complications  PONV Status: controlled  Cardiovascular status: acceptable  Respiratory status: acceptable  Hydration status: acceptable

## 2025-02-20 NOTE — ANESTHESIA PROCEDURE NOTES
Airway  Urgency: elective    Date/Time: 2/20/2025 10:16 AM  Airway not difficult    General Information and Staff    Patient location during procedure: OR  Anesthesiologist: Mary Hernandez MD  CRNA/CAA: Betty Toure CRNA    Indications and Patient Condition  Indications for airway management: airway protection    Preoxygenated: yes  Mask difficulty assessment: 2 - vent by mask + OA or adjuvant +/- NMBA    Final Airway Details  Final airway type: endotracheal airway      Successful airway: ETT  Cuffed: yes   Successful intubation technique: direct laryngoscopy  Facilitating devices/methods: intubating stylet  Endotracheal tube insertion site: oral  Blade: Hernandez  Blade size: 2  ETT size (mm): 7.5  Cormack-Lehane Classification: grade I - full view of glottis  Placement verified by: chest auscultation and capnometry   Measured from: lips  ETT/EBT  to lips (cm): 22  Number of attempts at approach: 1  Assessment: lips, teeth, and gum same as pre-op and atraumatic intubation    Additional Comments  Atraumatic, MOP to cuff, BSBE, no change to dentition, secured with tape

## 2025-02-20 NOTE — OP NOTE
TOTAL SHOULDER ARTHROPLASTY  Procedure Note    Jose Luis Zelaya  2/20/2025    Pre-op Diagnosis:   Primary glenohumeral osteoarthritis left shoulder    Post-op Diagnosis  Primary glenohumeral osteoarthritis left shoulder    Procedure:  Left total shoulder arthroplasty with stemless humeral stem   Surgeon: Juan Sweet M.D.    Surgical assistant: FLORIAN HEADLEY      Anesthesia: General with Block  Anesthesiologist: Mary Hernandez MD  CRNA: Betty Toure CRNA    Staff:   Circulator: Martine Gary RN; Rusty Castañeda RN  Scrub Person: Fina Covarrubias  Vendor Representative: Reyes MariHunter)  Assistant: Florian Headley APRN    Indication for Asst.  A surgical assistant,FLORIAN HEADLEY  , was utilized as a medical necessity and was present through the entire procedure allowing safe completion of the procedure as well as reducing overall operative time and morbidity for the patient.    IV antibiotic: Cefazolin    Estimated Blood Loss: 200 ml    Specimens: * No orders in the log *    Complications: None     Implant specifics:  Implant Name Type Inv. Item Serial No.  Lot No. LRB No. Used Action   CMT BONE PALACOS R HI/VISC 1X40 - [id number] Implant CMT BONE PALACOS R HI/VISC 1X40  [facility] [id number] Left 1 Implanted   REGINE AUG EQUINOXE LASR/CAGE POST 8DEG MD LT - [id number] Implant REGINE AUG EQUINOXE LASR/CAGE POST 8DEG MD LT  [employer] [id number] Left 1 Implanted   CAGE HUM/SHLDR EQUINOXE STEMLESS LASR SZ1 - [id number] Implant CAGE HUM/SHLDR EQUINOXE STEMLESS LASR SZ1  [employer] [id number] Left 1 Implanted   HD HUM/SHLDR STEMLESS EQUINOXE SHT 50MM - [id number] Implant HD HUM/SHLDR STEMLESS EQUINOXE SHT 50MM  [employer] [id number] Left 1 Implanted   SUT NONABS MAXBRAID/PE NMBR2 C7 38IN CONNIE 445100 - [id number] Implant SUT NONABS MAXBRAID/PE NMBR2 C7 38IN CONNIE [id number]  [employer] [id number] Left 2 Implanted       SURGICAL APPROACH: Deltopectoral      SURGICAL TECHNIQUE:  Tenotomy         Procedure: The patient was taken to the operative suite.  After adequate anesthesia was established the upper extremity was prepped and draped in the usual fashion.  The head was placed in a neutral position and bony prominences were padded.  Ioban was utilized covering the skin over the shoulder and axilla.  An anterior incision was made starting lateral to the coracoid towards the lateral aspect of the axillary crease through skin and subcutaneous tissues.  The deltopectoral interval was carefully entered.  The cephalic vein was preserved.  The clavipectoral fascia was divided.  The conjoined tendon was reflected medially protecting neurovascular structures.  Dissection was carried along the bicipital tendon up proximal to the subscapularis.  The biceps tendon was isolated and tenodesed to the pectoralis insertion with a #2 nonabsorbable suture.  The proximal aspect of the tendon was tenotomized.  The subscapularis was carefully tenotomized under direct visualization to leave the lateral cuff of tissue for later repair.. As I dissected inferiorly the circumflex vessels were cauterized and hemostasis was obtained.  The capsule was reflected off the humeral neck allowing for good external rotation.  The axillary nerve was protected throughout.  Severe arthropathic changes were noted in the glenohumeral joint. An external cutting guide was utilized and the head was cut at a 20° retroverted angle.  Osteophytes were excised as needed and the rotator cuff was protected throughout.  A topper was placed to size the humeral head.  We chose the appropriate head size that gave best coverage.  The guide was kept in place and a K wire was drilled to the lateral cortex.  We sized the humeral cage.  I then drilled a K wire through the lateral cortex.  The proximal humerus was reamed and broached.  The broach that was chosen was left in place and a head protector was placed. I then visualized the glenoid and  retractors were placed.  The capsule was reflected off the deep surface of the subscapularis.  An anterior retractor was placed to protect the subscapularis as well as the axillary nerve.  This allowed for visualization of the glenoid anteriorly.  I then dissected labrum superiorly and posteriorly and resected the biceps stump.  A Ruiz elevator was used to smooth the surface of the glenoid.  A posterior augmented guide was used as this was called for with the preoperative plan.  A central K wire was drilled followed by an 8 degree K wire for reaming.  Reaming was carried out over the K wire to the appropriate depth and at the appropriate angle.  Next a central cage hole was drilled followed by placement of a guide to drill the peripheral holes x 3.  I then trialed with good fit for the chosen a medium size glenoid component.  Next the bone was prepared by pulse lavage and and using Irrisept solution followed by drying the bone.  Bone graft was placed in the cage of the glenoid component.  The component was then press-fit into position with good fit and stability.  Attention was turned to the humeral side.  We trialed the short and tall  heads and chose the the head that gave best stability with full range of motion.  This closely match the original head cut.   The broach was then removed.  The bone was irrigated and dried.  I then compressed the small stemless humeral cage size into position in place.  The previously trialed 50 mm head was then chosen and compressed into position and I reduced the shoulder.Good range of motion was noted.  Good stability was noted.  Vigorous irrigation and suctioning was performed.  The subscapularis was meticulously repaired using multiple #2 nonabsorbable sutures.   The deltopectoral interval was closed with 0 Vicryl.  The subcutaneous tissues were closed with 2-0 Vicryl.  The skin was closed with a Zipline device.  The patient had a sterile compression dressing applied and was  awoken and taken to the postanesthetic recovery unit in good condition.      Procedure: Juan Sweet MD     Date: 2/20/2025  Time: 11:37 EST

## 2025-02-20 NOTE — ANESTHESIA PROCEDURE NOTES
Peripheral Block      Patient reassessed immediately prior to procedure    Patient location during procedure: pre-op  Start time: 2/20/2025 9:05 AM  Stop time: 2/20/2025 9:06 AM  Reason for block: at surgeon's request and post-op pain management  Performed by  Anesthesiologist: Mary Hernandez MD  Preanesthetic Checklist  Completed: patient identified, IV checked, site marked, risks and benefits discussed, surgical consent, monitors and equipment checked, pre-op evaluation and timeout performed  Prep:  Pt Position: sitting  Sterile barriers:cap, gloves and mask  Prep: ChloraPrep  Patient monitoring: blood pressure monitoring, continuous pulse oximetry and EKG  Procedure    Sedation: yes  Performed under: local infiltration  Guidance:ultrasound guided    ULTRASOUND INTERPRETATION.  Using ultrasound guidance a 22 G gauge needle was placed in close proximity to the brachial plexus nerve, at which point, under ultrasound guidance anesthetic was injected in the area of the nerve and spread of the anesthesia was seen on ultrasound in close proximity thereto.  There were no abnormalities seen on ultrasound; a digital image was taken; and the patient tolerated the procedure with no complications. Images:still images obtained, printed/placed on chart    Laterality:left  Block Type:interscalene  Injection Technique:single-shot  Needle Type:short-bevel and echogenic  Needle Gauge:22 G  Resistance on Injection: none    Medications Used: bupivacaine liposome (EXPAREL) 1.3 % injection - Infiltration   10 mL - 2/20/2025 9:06:00 AM  bupivacaine (PF) (MARCAINE) 0.5 % injection - Injection   10 mL - 2/20/2025 9:06:00 AM      Medications  Comment:Ultrasound Interpretation:  Using ultrasound guidance the needle was placed in close proximity to the target nerve and anesthetic was injected in the area of the target nerve and/or bundles, and spread of the anesthetic was seen on ultrasound in close proximity thereto.  There were no  abnormalities seen on ultrasound; a digital image was taken; and the patient tolerated the procedure with no complications.    Block placed for postoperative pain control per surgeon request.    Post Assessment  Injection Assessment: negative aspiration for heme, no paresthesia on injection and incremental injection  Patient Tolerance:comfortable throughout block  Complications:no  Performed by: Mary Hernandez MD

## 2025-02-20 NOTE — PLAN OF CARE
Goal Outcome Evaluation:  Plan of Care Reviewed With: patient, spouse           Outcome Evaluation: Pt is POD#0 L TSA.    Pt seen by [facility] the [facility] for review of precautions and education on dressing tech and how to isai/doff sling.  Issue and demo shoulder HEP with OT demo pendulum technique.   Ed complete HEP 1X10, 5-6 times a day and to hold exercises on own until block worn off & full sensation returned.   Pt plans to d/c home today with assist of spouse who is present for education.    Anticipated Discharge Disposition (OT): home with assist (OP PT per ortho MD)

## 2025-02-20 NOTE — ANESTHESIA PREPROCEDURE EVALUATION
" Anesthesia Evaluation     Patient summary reviewed and Nursing notes reviewed   no history of anesthetic complications:   NPO Solid Status: > 8 hours  NPO Liquid Status: > 2 hours           Airway   Mallampati: II  TM distance: >3 FB  Difficult intubation highly probable, Narrow palate and Large neck circumference  Dental - normal exam     Pulmonary    (+) a smoker Former,sleep apnea on CPAP  Cardiovascular     ECG reviewed    (+) hypertension, hyperlipidemia      Neuro/Psych  (+) psychiatric history Anxiety, Depression and PTSD  GI/Hepatic/Renal/Endo    (+) obesity, diabetes mellitus type 2    Musculoskeletal     Abdominal    Substance History      OB/GYN          Other   arthritis,                   Anesthesia Plan    ASA 2     general     (With PNB for POPC PSR    I have reviewed the patient's history and chart with the patient, including all pertinent laboratory results and imaging. I have explained the risks of anesthesia including but not limited to dental damage, corneal abrasion, nerve injury, MI, stroke, aspiration, and death. Patient has agreed to proceed.    Risks of peripheral nerve block were discussed with patient including but not limited to: inadequate block, bleeding, infection, persistent numbness or weakness, nerve damage, painful dysasthesia and need to protect limb while numb.      /99 (BP Location: Right arm, Patient Position: Sitting)   Pulse 88   Temp 36.7 °C (98.1 °F) (Oral)   Resp 20   Ht 175.3 cm (69\")   Wt 116 kg (255 lb 4.7 oz)   SpO2 99%   BMI 37.70 kg/m²   )  intravenous induction     Anesthetic plan, risks, benefits, and alternatives have been provided, discussed and informed consent has been obtained with: patient.    CODE STATUS:         "

## 2025-02-20 NOTE — THERAPY DISCHARGE NOTE
Acute Care - Occupational Therapy Discharge  [facility]    Patient Name: Jose Luis Zelaya  : 1975    MRN: [id number]                              Today's Date: 2025       Admit Date: 2025    Visit Dx:     ICD-10-CM ICD-9-CM   1. Status post total shoulder arthroplasty, left  Z96.612 V43.61     There is no problem list on file for this patient.    Past Medical History:   Diagnosis Date    Anxiety     Arthritis     Depression     Diabetes mellitus     History of kidney stones     Hyperlipidemia     Hypertension     PEDRITO (obstructive sleep apnea)     cpap    PTSD (post-traumatic stress disorder)     Shoulder pain     left     Past Surgical History:   Procedure Laterality Date    KNEE ARTHROSCOPY Left     VASECTOMY        General Information       Row Name 25 1415          OT Time and Intention    Subjective Information no complaints  -LE     Document Type evaluation;therapy note (daily note);discharge evaluation/summary  -LE     Mode of Treatment individual therapy;occupational therapy  -LE     Patient Effort excellent  -LE     Symptoms Noted During/After Treatment none  -LE       Row Name 25 1415          General Information    Patient Profile Reviewed yes  -LE     Prior Level of Function independent:  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  -LE     Existing Precautions/Restrictions fall;non-weight bearing  -LE     Barriers to Rehab none identified  -LE       Row Name 25 1415          Living Environment    People in Home spouse;child(darwin), adult  -LE       Row Name 25 1415          Cognition    Orientation Status (Cognition) oriented x 4  -LE       Row Name 25 1415          Safety Issues/Impairments Affecting Functional Mobility    Comment, Safety Issues/Impairments (Mobility) shoes worn.   Recommend helper walk along with pt while block in place to prevent bumping or swinging of surgery UE.  -LE               User Key  (r) = Recorded By, (t) = Taken By, (c) = Cosigned By      Initials  Name Provider Type    Saida Young, OTR Occupational Therapist                   Mobility/ADL's       Row Name 02/20/25 1415          Bed Mobility    Comment, (Bed Mobility) UIC  -[facility] Name 02/20/25 1415          Transfers    Comment, (Transfers) pt up in chair.  denies mobilty concerns.  -       Row Name 02/20/25 1415          Activities of Daily Living    BADL Assessment/Intervention upper body dressing;lower body dressing;grooming;toileting  -[facility] Name 02/20/25 1415          Lower Body Dressing Assessment/Training    [place] Level (Lower Body Dressing) don;doff;socks;shoes/slippers;set up;verbal cues  -CHIQUITA     Comment, (Lower Body Dressing) ed one hand tech for donning socks and hiking pants and pt return demo.already has pants on.  OT  reviews benefit of pull on pants and slip on shoes.  -[facility] Name 02/20/25 1415          Upper Body Dressing Assessment/Training    [place] Level (Upper Body Dressing) don;pull-over garment;set up;verbal cues;maximum assist (25% patient effort)  -CHIQUITA     Position (Upper Body Dressing) supported sitting  -LE     Comment, (Upper Body Dressing) Educate on threading tech for surgery arm first and to undress last.  Ed option to use pendulum position to thread sleeve once block has worn off.  ed how to isai and doff sling and how to reposition as needed.  -[facility] Name 02/20/25 1415          Grooming Assessment/Training    Comment, (Grooming) ed option to use pendulum position to apply deodorant and wash axilla.  -CHIQUITA               User Key  (r) = Recorded By, (t) = Taken By, (c) = Cosigned By      Initials Name Provider Type    Saida Young, OTR Occupational Therapist                   Obj/Interventions       Row Name 02/20/25 1417          Sensory Assessment (Somatosensory)    Sensory Assessment block to surgery UE.  discussing using thumb loop spariling to reduce pressure/nerve issues.  -[facility] Name 02/20/25 1417          Shoulder  (Therapeutic Exercise)    Shoulder (Therapeutic Exercise) pendulum exercises  OT demo pendulum tech both seated and standing.  pt and wife ask clarifying questions and verbalize understanding of tech.  -       Row Name 02/20/25 1417          Motor Skills    Therapeutic Exercise shoulder;elbow/forearm;wrist;hand  -LE               User Key  (r) = Recorded By, (t) = Taken By, (c) = Cosigned By      Initials Name Provider Type    Saida Young OTR Occupational Therapist                   Goals/Plan       Row Name 02/20/25 1421          ROM Goal 1 (OT)    ROM Goal 1 (OT) Pt and family to verbalize understanding of HEP for UE.  -LE     Time Frame (ROM Goal 1, OT) 1 day  -LE     Progress/Outcome (ROM Goal 1, OT) goal met  -LE       Row Name 02/20/25 1421          Problem Specific Goal 1 (OT)    Problem Specific Goal 1 (OT) Pt and family to verbalized understanding of precautions, dressing tech with immobilized UE and purpose and position of sling.  -LE     Time Frame (Problem Specific Goal 1, OT) 1 day  -LE     Progress/Outcome (Problem Specific Goal 1, OT) goal met  -LE               User Key  (r) = Recorded By, (t) = Taken By, (c) = Cosigned By      Initials Name Provider Type    Saida Young OTR Occupational Therapist                   Clinical Impression       Row Name 02/20/25 1419          Pain Assessment    Pretreatment Pain Rating 0/10 - no pain  -       Row Name 02/20/25 1419          Plan of Care Review    Plan of Care Reviewed With patient;spouse  -LE     Outcome Evaluation Pt is POD#0 L TSA.    Pt seen by OTin the [facility] for review of precautions and education on dressing tech and how to isai/doff sling.  Issue and demo shoulder HEP with OT demo pendulum technique.   Ed complete HEP 1X10, 5-6 times a day and to hold exercises on own until block worn off & full sensation returned.   Pt plans to d/c home today with assist of spouse who is present for education.  -       Row Name 02/20/25 1410           Therapy Assessment/Plan (OT)    Therapy Frequency (OT) evaluation only  -CHIQUITA       Row Name 02/20/25 1419          Therapy Plan Review/Discharge Plan (OT)    Anticipated Discharge Disposition (OT) home with assist  OP PT per ortho MD  -CHIQUITA       Row Name 02/20/25 1419          Vital Signs    O2 Delivery Pre Treatment room air  -LE     Pre Patient Position Sitting  -LE     Intra Patient Position Sitting  -LE     Post Patient Position Sitting  -LE       Row Name 02/20/25 1419          Positioning and Restraints    Pre-Treatment Position sitting in chair/recliner  -LE     Post Treatment Position chair  -LE     In Chair notified nsg;sitting;call light within reach;encouraged to call for assist;with family/caregiver;LUE elevated  -LE               User Key  (r) = Recorded By, (t) = Taken By, (c) = Cosigned By      Initials Name Provider Type    Saida Young OTR Occupational Therapist                   Outcome Measures       Row Name 02/20/25 1421          How much help from another is currently needed...    Putting on and taking off regular lower body clothing? 3  -LE     Bathing (including washing, rinsing, and drying) 3  -LE     Toileting (which includes using toilet bed pan or urinal) 3  -LE     Putting on and taking off regular upper body clothing 2  -LE     Taking care of personal grooming (such as brushing teeth) 3  -LE     Eating meals 3  -LE     AM-PAC 6 Clicks Score (OT) 17  -LE       Row Name 02/20/25 1421          Functional Assessment    Outcome Measure Options AM-PAC 6 Clicks Daily Activity (OT)  -LE               User Key  (r) = Recorded By, (t) = Taken By, (c) = Cosigned By      Initials Name Provider Type    Saida Young OTR Occupational Therapist                  Occupational Therapy Education       Title: PT OT SLP Therapies (Done)       Topic: Occupational Therapy (Done)       Point: ADL training (Done)       Description:   Instruct learner(s) on proper safety adaptation and remediation  techniques during self care or transfers.   Instruct in proper use of assistive devices.                  Learning Progress Summary            Patient Acceptance, E,TB,D,H, DU,VU by CHIQUITA at 2/20/2025 1422   Family Acceptance, E,TB,D,H, DU,VU by CHIQUITA at 2/20/2025 1422                      Point: Home exercise program (Done)       Description:   Instruct learner(s) on appropriate technique for monitoring, assisting and/or progressing therapeutic exercises/activities.                  Learning Progress Summary            Patient Acceptance, E,TB,D,H, DU,VU by CHIQUITA at 2/20/2025 1422   Family Acceptance, E,TB,D,H, DU,VU by CHIQUITA at 2/20/2025 1422                      Point: Precautions (Done)       Description:   Instruct learner(s) on prescribed precautions during self-care and functional transfers.                  Learning Progress Summary            Patient Acceptance, E,TB,D,H, DU,VU by CHIQUITA at 2/20/2025 1422   Family Acceptance, E,TB,D,H, DU,VU by CHIQUITA at 2/20/2025 1422                                      User Key       Initials Effective Dates Name Provider Type Discipline    CHIQUITA 06/16/21 -  Saida Everett OTR Occupational Therapist OT                  OT Recommendation and Plan  Therapy Frequency (OT): evaluation only  Plan of Care Review  Plan of Care Reviewed With: patient, spouse  Outcome Evaluation: Pt is POD#0 L TSA.    Pt seen by OTin the [facility] for review of precautions and education on dressing tech and how to isai/doff sling.  Issue and demo shoulder HEP with OT demo pendulum technique.   Ed complete HEP 1X10, 5-6 times a day and to hold exercises on own until block worn off & full sensation returned.   Pt plans to d/c home today with assist of spouse who is present for education.  Plan of Care Reviewed With: patient, spouse  Outcome Evaluation: Pt is POD#0 L TSA.    Pt seen by OTin the [facility] for review of precautions and education on dressing tech and how to isai/doff sling.  Issue and demo shoulder HEP with OT demo  pendulum technique.   Ed complete HEP 1X10, 5-6 times a day and to hold exercises on own until block worn off & full sensation returned.   Pt plans to d/c home today with assist of spouse who is present for education.     Time Calculation:   Evaluation Complexity (OT)  Review Occupational Profile/Medical/Therapy History Complexity: brief/low complexity  Assessment, Occupational Performance/Identification of Deficit Complexity: 1-3 performance deficits  Clinical Decision Making Complexity (OT): problem focused assessment/low complexity  Overall Complexity of Evaluation (OT): low complexity     Time Calculation- OT       Row Name 02/20/25 1422             Time Calculation- OT    OT Start Time 1347  -LE      OT Stop Time 1405  -LE      OT Time Calculation (min) 18 min  -LE      Total Timed Code Minutes- OT 12 minute(s)  -LE      OT Received On 02/20/25  -LE         Timed Charges    16581 - OT Self Care/Mgmt Minutes 12  -LE         Total Minutes    Timed Charges Total Minutes 12  -LE       Total Minutes 12  -LE                User Key  (r) = Recorded By, (t) = Taken By, (c) = Cosigned By      Initials Name Provider Type    Saida Young OTR Occupational Therapist                  Therapy Charges for Today       Code Description Service Date Service Provider Modifiers Qty    [id number]  OT SELF CARE/MGMT/TRAIN EA 15 MIN 2/20/2025 Saida Everett OTR GO 1    [id number]  OT EVAL LOW COMPLEXITY 2 2/20/2025 Saida Everett OTR GO 1               OT Discharge Summary  Anticipated Discharge Disposition (OT): home with assist  Reason for Discharge: All goals achieved, Discharge from facility  Discharge Destination: Home with assist    HUEY Jarrett  2/20/2025

## 2025-02-24 ENCOUNTER — TELEPHONE (OUTPATIENT)
Dept: ORTHOPEDIC SURGERY | Facility: HOSPITAL | Age: 50
End: 2025-02-24
Payer: OTHER GOVERNMENT

## 2025-02-24 NOTE — TELEPHONE ENCOUNTER
Post op day 4  Discharge Instructions:  Ask patient about his or her discharge instructions  ?  Patient confirmed understanding   []  Further instruction needed   What, if any, recommendations, teaching, or interventions did you provide? Click or tap here to enter text.  Health status:  Pain controlled Yes   Pain is tolerable with the pain medication.   Recommended interventions:  Yes  incision/dressing status   ?  Clean without redness, drainage, odor  []  Redness    []  Drainage - color Click or tap here to enter text.  []  Odor  GAGANDEEP - Green light blinking Choose an item.  Difficulties urination No  Last BM 2/24/2025 (if no BM by day 3-recommend OTC suppository or fleets enema)  Taking Colace BM's are doing ok   Medications:  ?Medications reviewed with patient/family/caregiver  Patient taking medications as prescribed?   Yes  If not taking medications as prescribed, note specific medicine(s) and reason for each:  Click or tap here to enter text.  Hospital Follow Up Plan:  Follow up Appointment with Orthopedic surgeon:  ?Has f/u appointment                []Scheduled f/u appointment  Home Care ordered at discharge?    No        Home Care started, or contact made?    No   If no, action taken: Total Shoulder  DME obtained/used in home?         Yes   Using IS  Choose an item.   Other information: Mr. Zelaya said he is doing ok. The block wore off on POD 1. He is taking the pain medication regularly and icing and it's keeping it tolerable. He has started with some of the exercises and remains in the sling. The dressing was changed and he has been able to take a shower. BM's are doing ok with colace. Mr. Zelaya doesn't have any questions for me at this time. He has my contact information should he need anything.

## 2025-02-25 DIAGNOSIS — Z96.612 STATUS POST TOTAL SHOULDER ARTHROPLASTY, LEFT: ICD-10-CM

## 2025-02-25 RX ORDER — OXYCODONE AND ACETAMINOPHEN 5; 325 MG/1; MG/1
1 TABLET ORAL EVERY 4 HOURS PRN
Qty: 30 TABLET | Refills: 0 | Status: CANCELLED | OUTPATIENT
Start: 2025-02-25

## 2025-03-20 ENCOUNTER — HOSPITAL ENCOUNTER (OUTPATIENT)
Dept: PHYSICAL THERAPY | Facility: HOSPITAL | Age: 50
Setting detail: THERAPIES SERIES
Discharge: HOME | End: 2025-03-20
Payer: OTHER GOVERNMENT

## 2025-03-20 ENCOUNTER — TELEPHONE (OUTPATIENT)
Dept: ORTHOPEDIC SURGERY | Facility: HOSPITAL | Age: 50
End: 2025-03-20
Payer: OTHER GOVERNMENT

## 2025-03-20 DIAGNOSIS — Z96.612 STATUS POST TOTAL SHOULDER ARTHROPLASTY, LEFT: Primary | ICD-10-CM

## 2025-03-20 PROCEDURE — 97161 PT EVAL LOW COMPLEX 20 MIN: CPT

## 2025-03-20 NOTE — TELEPHONE ENCOUNTER
Called and spoke with Mr. Zelaya to see how he has been doing since his LTSRA 2/20. He said he is doing well. He had his first OP PT appointment today and it went well but he's feeling it now. He is using the ice and it helps. Mr. Zelaya doesn't have any questions for me at this time. He has my contact information should he need anything.

## 2025-03-20 NOTE — THERAPY EVALUATION
Outpatient Physical Therapy Ortho Initial Evaluation   [facility]     Patient Name: Jose Luis Zelaya  : 1975  MRN: [id number]  Today's Date: 3/20/2025      Visit Date: 2025    There is no problem list on file for this patient.       Past Medical History:   Diagnosis Date    Anxiety     Arthritis     Depression     Diabetes mellitus     History of kidney stones     Hyperlipidemia     Hypertension     PEDRITO (obstructive sleep apnea)     cpap    PTSD (post-traumatic stress disorder)     Shoulder pain     left        Past Surgical History:   Procedure Laterality Date    KNEE ARTHROSCOPY Left     TOTAL SHOULDER ARTHROPLASTY Left 2025    Procedure: LEFT TOTAL SHOULDER ARTHROPLASTY;  Surgeon: Juan Sweet MD;  Location: [facility] OR [facility] [place];  Service: Orthopedics;  Laterality: Left;    VASECTOMY         Visit Dx:     ICD-10-CM ICD-9-CM   1. Status post total shoulder arthroplasty, left  Z96.612 V43.61          Patient History       Row Name 25 0700             History    Chief Complaint Difficulty with daily activities;Joint stiffness;Muscle tenderness;Muscle weakness;Pain  -AS      Type of Pain Shoulder pain  -AS      Date Current Problem(s) Began 25  -AS      Brief Description of Current Complaint Jose Luis Zelaya is a 50 y.o. male who presents to outpatient PT s/p left reverse TSA. Surgery was performed on 25 by Dr. Sweet. Patient was D/C home the same day with a referral for outpatient PT to start at week 4 post op. Patient is to wear sling for 6 weeks post op and she has been compliant with this. Patient reports pain has been well controlled with pain medication and the use of ice.  -AS      Previous treatment for THIS PROBLEM Surgery;Medication  -AS      Surgery Date: 25  -AS      Patient/Caregiver Goals Relieve pain;Return to prior level of function;Improve strength  -AS      Patient's Rating of General Health Good  -AS      Patient seeing anyone else for problem(s)? Dr. Sweet   -AS      How has patient tried to help current problem? rest, sling, medication, ice  -AS      What clinical tests have you had for this problem? X-ray  -AS      Results of Clinical Tests End Stage OA  -AS      Surgery/Hospitalization Left TSA 2-20-25  -AS         Pain     Pain Location Shoulder  -AS      Pain Frequency Intermittent  -AS      Pain Description Aching  -AS      What Performance Factors Make the Current Problem(s) WORSE? Activity  -AS      What Performance Factors Make the Current Problem(s) BETTER? Rest  -AS         Daily Activities    Primary Language English  -AS      Are you able to read Yes  -AS      Are you able to write Yes  -AS      How does patient learn best? Listening;Reading;Demonstration  -AS      Teaching needs identified Home Exercise Program;Management of Condition  -AS      Patient is concerned about/has problems with Difficulty with self care (i.e. bathing, dressing, toileting:;Flexibility;Performing home management (household chores, shopping, care of dependents);Performing job responsibilities/community activities (work, school,;Performing sports, recreation, and play activities;Reaching over head;Repetitive movements of the hand, arm, shoulder  -AS      Does patient have problems with the following? None  -AS      Barriers to learning None  -AS      Pt Participated in POC and Goals Yes  -AS         Safety    Are you being hurt, hit, or frightened by anyone at home or in your life? No  -AS      Are you being neglected by a caregiver No  -AS                User Key  (r) = Recorded By, (t) = Taken By, (c) = Cosigned By      Initials Name Provider Type    AS Kevin Arroyo, PT Physical Therapist                     PT Ortho       Row Name 03/20/25 0700       Precautions and Contraindications    Precautions/Limitations shoulder precautions  -AS       Posture/Observations    Posture- WNL Posture is WNL  -AS    Observations Incision healing  -AS       Left Upper Ext    Lt Shoulder  Abduction PROM 125  -AS    Lt Shoulder Flexion PROM 125  -AS    Lt Shoulder External Rotation PROM 45  -AS    Lt Shoulder Internal Rotation PROM 65  -AS       MMT Left Upper Ext    Lt Shoulder Flexion MMT, Gross Movement --  Not tested at IE due to recent surgery  -AS    Lt Shoulder ABduction MMT, Gross Movement --  Not tested at IE due to recent surgery  -AS    Lt Shoulder Internal Rotation MMT, Gross Movement --  Not tested at IE due to recent surgery  -AS    Lt Shoulder External Rotation MMT, Gross Movement --  Not tested at IE due to recent surgery  -AS       Sensation    Sensation WNL? WNL  -AS    Light Touch No apparent deficits  -AS              User Key  (r) = Recorded By, (t) = Taken By, (c) = Cosigned By      Initials Name Provider Type    AS Kevin Arroyo, PT Physical Therapist                                Therapy Education  Given: HEP, Symptoms/condition management, Pain management  Program: New  How Provided: Verbal, Demonstration, Written  Provided to: Patient  Level of Understanding: Teach back education performed, Verbalized, Demonstrated      PT OP Goals       Row Name 03/20/25 0700          PT Short Term Goals    STG 1 Patient to demonstrate compliance with initial HEP for flexibility, ROM and strengthening.  -AS     STG 2 Patient to report left shoulder pain on VAS of 4-5/10 at worst with activity.  -AS     STG 3 Patient to demonstrate improved left shoulder strength to 4/5 in all planes.  -AS     STG 4 Patient to demonstrate improved left shoulder PROM to WFL in all planes.  -AS        Long Term Goals    LTG 1 Patient to demonstrate compliance with advanced HEP for flexibility, ROM and strengthening.  -AS     LTG 2 Patient to report left shoulder pain on VAS of 1-2/10 at worst with activity.  -AS     LTG 3 Patient to demonstrate improved left shoulder strength to 4+/5 in all planes.  -AS     LTG 4 Patient to demonstrate improved left shoulder AROM to WFL in all planes.  -AS     LTG 5  Patient to report improved function and decreased pain Quick DASH by >10-15 points.  -AS               User Key  (r) = Recorded By, (t) = Taken By, (c) = Cosigned By      Initials Name Provider Type    AS Kevin Arroyo, PT Physical Therapist                     PT Assessment/Plan       Row Name 03/20/25 0700          PT Assessment    Functional Limitations Limitation in home management;Limitations in community activities;Performance in leisure activities;Performance in sport activities;Performance in work activities  -AS     Impairments Impaired flexibility;Muscle strength;Pain;Range of motion  -AS     Assessment Comments Jose Luis Zelaya is a 50 y.o. male who presents to outpatient PT s/p left reverse TSA. Surgery was performed on 2-20-25 by Dr. Sweet. Patient was D/C home the same day with a referral for outpatient PT to start at week 4 post op. Patient is to wear sling for 6 weeks post op and she has been compliant with this. Patient reports pain has been well controlled with pain medication and the use of ice. Patient has limited left shoulder ROM, limited left shoulder and LUE strength, and increased pain and discomfort with activity. Patient has limited function at this time secondary to the above.  -AS     Please refer to paper survey for additional self-reported information Yes  -AS     Rehab Potential Good  -AS     Patient/caregiver participated in establishment of treatment plan and goals Yes  -AS     Patient would benefit from skilled therapy intervention Yes  -AS        PT Plan    PT Frequency 2x/week  -AS     Predicted Duration of Therapy Intervention (PT) 6-8 weeks  -AS     Planned CPT's? PT RE-EVAL: 28701;PT THER PROC EA 15 MIN: 86362;PT THER ACT EA 15 MIN: 00174;PT MANUAL THERAPY EA 15 MIN: 51527;PT NEUROMUSC RE-EDUCATION EA 15 MIN: 46571  -AS               User Key  (r) = Recorded By, (t) = Taken By, (c) = Cosigned By      Initials Name Provider Type    AS Kevin Arroyo, PT Physical  Therapist                     Modalities       Row Name 03/20/25 0700             Moist Heat    MH Applied Yes  -AS      Location Left Shoulder - Sitting  -AS      PT Moist Heat Minutes 10  -AS      MH Prior to Rx Yes  -AS                User Key  (r) = Recorded By, (t) = Taken By, (c) = Cosigned By      Initials Name Provider Type    AS Kevin Arroyo, PT Physical Therapist                   OP Exercises       Row Name 03/20/25 0700             Subjective Pain    Able to rate subjective pain? yes  -AS      Pre-Treatment Pain Level 5  -AS      Post-Treatment Pain Level 5  -AS         Exercise 1    Exercise Name 1 3-Way Cane  -AS      Reps 1 15 each  -AS      Time 1 5 sec hold each  -AS         Exercise 2    Exercise Name 2 Sleeper Stretch  -AS         Exercise 3    Exercise Name 3 Serratus Punches  -AS         Exercise 4    Exercise Name 4 S/L ER  -AS         Exercise 5    Exercise Name 5 Prone Y, T  -AS         Exercise 6    Exercise Name 6 Empty/Full Can  -AS         Exercise 7    Exercise Name 7 Rows  -AS         Exercise 8    Exercise Name 8 Extensions  -AS         Exercise 9    Exercise Name 9 IR  -AS         Exercise 10    Exercise Name 10 ER  -AS         Exercise 11    Exercise Name 11 Pulley's - Flex & ABD  -AS      Time 11 3 min each  -AS                User Key  (r) = Recorded By, (t) = Taken By, (c) = Cosigned By      Initials Name Provider Type    AS Kevin Arroyo, PT Physical Therapist                  Manual Rx (Last 36 Hours)       Manual Treatments       Row Name 03/20/25 0700             Manual Rx 1    Manual Rx 1 Location Left Shoulder  -AS      Manual Rx 1 Type PROM - Flex, ABD, IR, ER  -AS      Manual Rx 1 Duration 15 min  -AS                User Key  (r) = Recorded By, (t) = Taken By, (c) = Cosigned By      Initials Name Provider Type    AS Kevin Arroyo, PT Physical Therapist                                Outcome Measure Options: Quick DASH  Quick DASH  Open a tight or  new jar.: Severe Difficulty  Do heavy household chores (e.g., wash walls, wash floors): Severe Difficulty  Carry a shopping bag or briefcase: Severe Difficulty  Wash your back: Severe Difficulty  Use a knife to cut food: Moderate Difficulty  Recreational activities in which you take some force or impact through your arm, should or hand (e.g. golf, hammering, tennis, etc.): Unable  During the past week, to what extent has your arm, shoulder, or hand problem interfered with your normal social activites with family, friends, neighbors or groups?: Moderately  During the past week, were you limited in your work or other regular daily activities as a result of your arm, shoulder or hand problem?: Very limited  Arm, Shoulder, or hand pain: Moderate  Tingling (pins and needles) in your arm, shoulder, or hand: None  During the past week, how much difficulty have you had sleeping because of the pain in your arm, shoulder or hand?: Moderate Difficiculty  Number of Questions Answered: 11  Quick DASH Score: 61.36  Work Module (Optional)  Using your usual technique for your work?: Unable  Doing your usual work because of arm, shoulder or hand pain?: Unable  Doing your work as well as you would like?: Unable  Spending your usual amount of time doing your work?: Unable  Work Module Score: 100         Time Calculation:     Start Time: 0800  Stop Time: 0857  Time Calculation (min): 57 min  Untimed Charges  PT Moist Heat Minutes: 10  Total Minutes  Untimed Charges Total Minutes: 10   Total Minutes: 10     Therapy Charges for Today       Code Description Service Date Service Provider Modifiers Qty    41629368188 HC PT EVAL LOW COMPLEXITY 4 3/20/2025 Kevin Arroyo, PT GP 1            PT G-Codes  Outcome Measure Options: Quick DASH  Quick DASH Score: 61.36         Kevin Arroyo, PT  3/20/2025

## 2025-03-25 ENCOUNTER — HOSPITAL ENCOUNTER (OUTPATIENT)
Dept: PHYSICAL THERAPY | Facility: HOSPITAL | Age: 50
Setting detail: THERAPIES SERIES
Discharge: HOME | End: 2025-03-25
Payer: OTHER GOVERNMENT

## 2025-03-25 DIAGNOSIS — Z96.612 STATUS POST TOTAL SHOULDER ARTHROPLASTY, LEFT: Primary | ICD-10-CM

## 2025-03-25 PROCEDURE — 97140 MANUAL THERAPY 1/> REGIONS: CPT

## 2025-03-25 PROCEDURE — 97110 THERAPEUTIC EXERCISES: CPT

## 2025-03-25 NOTE — THERAPY TREATMENT NOTE
Outpatient Physical Therapy Ortho Treatment Note   [facility]     Patient Name: Jose Luis Zelaya  : 1975  MRN: [id number]  Today's Date: 3/25/2025      Visit Date: 2025    Visit Dx:    ICD-10-CM ICD-9-CM   1. Status post total shoulder arthroplasty, left  Z96.612 V43.61       There is no problem list on file for this patient.       Past Medical History:   Diagnosis Date    Anxiety     Arthritis     Depression     Diabetes mellitus     History of kidney stones     Hyperlipidemia     Hypertension     PEDRITO (obstructive sleep apnea)     cpap    PTSD (post-traumatic stress disorder)     Shoulder pain     left        Past Surgical History:   Procedure Laterality Date    KNEE ARTHROSCOPY Left     TOTAL SHOULDER ARTHROPLASTY Left 2025    Procedure: LEFT TOTAL SHOULDER ARTHROPLASTY;  Surgeon: Juan Sweet MD;  [place] [facility] [place] [facility] [place]  Service: Orthopedics;  Laterality: Left;    VASECTOMY                          PT Assessment/Plan       Row Name 25 0700          PT Assessment    Assessment Comments Continued with AAROM and PROM exercises today and he tolerated this well. Patient presents with improved left shoulder ROM in all planes. Plan to continue to progress patient per tolerance and his post op protocol.  -AS        PT Plan    PT Plan Comments Continue with current treatment plan.  -AS               User Key  (r) = Recorded By, (t) = Taken By, (c) = Cosigned By      Initials Name Provider Type    AS Kevin Arroyo, PT Physical Therapist                     Modalities       Row Name 25 0700 25 0650          Moist Heat    MH Applied Yes  -AS --     Location Left Shoulder - Sitting  -AS Left Shoulder - Sitting  -KF     PT Moist Heat Minutes 10  -AS 10  -KF     MH Prior to Rx Yes  -AS Yes  -KF        Functional Testing    Outcome Measure Options Quick DASH  -AS Quick DASH  -KF               User Key  (r) = Recorded By, (t) = Taken By, (c) = Cosigned By      Initials Name  "Provider Type    AS Kevin Arroyo, PT Physical Therapist    Maria Luisa Mayer, PTA Physical Therapist Assistant                   OP Exercises       Row Name 03/25/25 0728 03/25/25 0700          Subjective    Subjective Comments -- Patient states his shoulder is \"killing me today.\" He states he may have over done it a doctors appointment yesterday. He states he is being compliant with his HEP.  -AS        Total Minutes    47738 - PT Therapeutic Exercise Minutes 15  -AS --     94534 - PT Manual Therapy Minutes 15  -AS --        Exercise 1    Exercise Name 1 -- 3-Way Cane  -AS     Reps 1 -- 15 each  -AS     Time 1 -- 5 sec hold each  -AS        Exercise 2    Exercise Name 2 -- Sleeper Stretch  -AS        Exercise 3    Exercise Name 3 -- Serratus Punches  -AS        Exercise 4    Exercise Name 4 -- S/L ER  -AS        Exercise 5    Exercise Name 5 -- Prone Y, T  -AS        Exercise 6    Exercise Name 6 -- Empty/Full Can  -AS        Exercise 7    Exercise Name 7 -- Rows  -AS        Exercise 8    Exercise Name 8 -- Extensions  -AS        Exercise 9    Exercise Name 9 -- IR  -AS        Exercise 10    Exercise Name 10 -- ER  -AS        Exercise 11    Exercise Name 11 -- Pulley's - Flex & ABD  -AS     Time 11 -- 3 min each  -AS               User Key  (r) = Recorded By, (t) = Taken By, (c) = Cosigned By      Initials Name Provider Type    AS Kevin Arroyo, PT Physical Therapist                             Manual Rx (Last 36 Hours)       Manual Treatments       Row Name 03/25/25 0728 03/25/25 0700          Total Minutes    97360 - PT Manual Therapy Minutes 15  -AS --        Manual Rx 1    Manual Rx 1 Location -- Left Shoulder  -AS     Manual Rx 1 Type -- PROM - Flex, ABD, IR, ER  -AS     Manual Rx 1 Duration -- 15 min  -AS               User Key  (r) = Recorded By, (t) = Taken By, (c) = Cosigned By      Initials Name Provider Type    AS Kevin Arroyo, PT Physical Therapist                         "     Outcome Measure Options: Quick DASH         Time Calculation:   Start Time: 0648  Stop Time: 0728  Time Calculation (min): 40 min  Timed Charges  30855 - PT Therapeutic Exercise Minutes: 15  59643 - PT Manual Therapy Minutes: 15  Untimed Charges  PT Moist Heat Minutes: 10  Total Minutes  Timed Charges Total Minutes: 30  Untimed Charges Total Minutes: 10   Total Minutes: 40  Therapy Charges for Today       Code Description Service Date Service Provider Modifiers Qty    50099642032 HC PT THER PROC EA 15 MIN 3/25/2025 Kevin Arroyo, PT GP 1    51562319313 HC PT MANUAL THERAPY EA 15 MIN 3/25/2025 Kevin Arroyo, PT GP 1            PT G-Codes  Outcome Measure Options: Tiffany Arroyo, PT  3/25/2025

## 2025-03-27 ENCOUNTER — HOSPITAL ENCOUNTER (OUTPATIENT)
Dept: PHYSICAL THERAPY | Facility: HOSPITAL | Age: 50
Setting detail: THERAPIES SERIES
Discharge: HOME | End: 2025-03-27
Payer: OTHER GOVERNMENT

## 2025-03-27 DIAGNOSIS — Z96.612 STATUS POST TOTAL SHOULDER ARTHROPLASTY, LEFT: Primary | ICD-10-CM

## 2025-03-27 PROCEDURE — 97110 THERAPEUTIC EXERCISES: CPT

## 2025-03-27 PROCEDURE — 97140 MANUAL THERAPY 1/> REGIONS: CPT

## 2025-03-27 NOTE — THERAPY TREATMENT NOTE
Outpatient Physical Therapy Ortho Treatment Note   [facility]     Patient Name: Jose Luis Zelaya  : 1975  MRN: [id number]  Today's Date: 3/27/2025      Visit Date: 2025    Visit Dx:    ICD-10-CM ICD-9-CM   1. Status post total shoulder arthroplasty, left  Z96.612 V43.61       There is no problem list on file for this patient.       Past Medical History:   Diagnosis Date    Anxiety     Arthritis     Depression     Diabetes mellitus     History of kidney stones     Hyperlipidemia     Hypertension     PEDRITO (obstructive sleep apnea)     cpap    PTSD (post-traumatic stress disorder)     Shoulder pain     left        Past Surgical History:   Procedure Laterality Date    KNEE ARTHROSCOPY Left     TOTAL SHOULDER ARTHROPLASTY Left 2025    Procedure: LEFT TOTAL SHOULDER ARTHROPLASTY;  Surgeon: Juan Sweet MD;  [place] [facility] [place] [facility] [place]  Service: [place]opedics;  Laterality: Left;    VASECTOMY                          PT Assessment/Plan       Row Name 25 0600          PT Assessment    Assessment Comments Continued with AAROM and PROM exercises today and he tolerated this well. Patient presents with improved left shoulder ROM in all planes. Plan to continue to progress patient per tolerance and his post op protocol.  -AS        PT Plan    PT Plan Comments Continue with current treatment plan.  -AS               User Key  (r) = Recorded By, (t) = Taken By, (c) = Cosigned By      Initials Name Provider Type    AS Kevin Arroyo, PT Physical Therapist                     Modalities       Row Name 25 0600             Moist Heat    MH Applied Yes  -AS      Location Left Shoulder - Sitting  -AS      PT Moist Heat Minutes 10  -AS      MH Prior to Rx Yes  -AS         Functional Testing    Outcome Measure Options Quick DASH  -AS                User Key  (r) = Recorded By, (t) = Taken By, (c) = Cosigned By      Initials Name Provider Type    AS Kevin Arroyo, PT Physical Therapist          "          OP Exercises       Row Name 03/27/25 0727 03/27/25 0600          Subjective    Subjective Comments -- Patient states his shoulder \"feels tight today.\"  -AS        Total Minutes    11740 - PT Therapeutic Exercise Minutes 15  -AS --     20805 - PT Manual Therapy Minutes 15  -AS --        Exercise 1    Exercise Name 1 -- 3-Way Cane  -AS     Reps 1 -- 15 each  -AS     Time 1 -- 5 sec hold each  -AS        Exercise 2    Exercise Name 2 -- Sleeper Stretch  -AS        Exercise 3    Exercise Name 3 -- Serratus Punches  -AS        Exercise 4    Exercise Name 4 -- S/L ER  -AS        Exercise 5    Exercise Name 5 -- Prone Y, T  -AS        Exercise 6    Exercise Name 6 -- Empty/Full Can  -AS        Exercise 7    Exercise Name 7 -- Rows  -AS        Exercise 8    Exercise Name 8 -- Extensions  -AS        Exercise 9    Exercise Name 9 -- IR  -AS        Exercise 10    Exercise Name 10 -- ER  -AS        Exercise 11    Exercise Name 11 -- Pulley's - Flex & ABD  -AS     Time 11 -- 4 min each  -AS        Exercise 12    Exercise Name 12 -- Wall Slides - Flex Only  -AS     Reps 12 -- 15  -AS     Time 12 -- 5 sec hold each  -AS               User Key  (r) = Recorded By, (t) = Taken By, (c) = Cosigned By      Initials Name Provider Type    AS Kevin Arroyo, PT Physical Therapist                             Manual Rx (Last 36 Hours)       Manual Treatments       Row Name 03/27/25 0727 03/27/25 0500          Total Minutes    85941 - PT Manual Therapy Minutes 15  -AS --        Manual Rx 1    Manual Rx 1 Location -- Left Shoulder  -AS     Manual Rx 1 Type -- PROM - Flex, ABD, IR, ER  -AS     Manual Rx 1 Duration -- 15 min  -AS               User Key  (r) = Recorded By, (t) = Taken By, (c) = Cosigned By      Initials Name Provider Type    AS Kevin Arroyo, PT Physical Therapist                             Outcome Measure Options: Quick DASH         Time Calculation:   Start Time: 0643  Stop Time: 0727  Time Calculation " (min): 44 min  Timed Charges  12705 - PT Therapeutic Exercise Minutes: 15  38677 - PT Manual Therapy Minutes: 15  Untimed Charges  PT Moist Heat Minutes: 10  Total Minutes  Timed Charges Total Minutes: 30  Untimed Charges Total Minutes: 10   Total Minutes: 40  Therapy Charges for Today       Code Description Service Date Service Provider Modifiers Qty    12269318627 HC PT THER PROC EA 15 MIN 3/27/2025 Kevin Arroyo, PT GP 1    46715433588 HC PT MANUAL THERAPY EA 15 MIN 3/27/2025 Kevin Arroyo, PT GP 1            PT G-Codes  Outcome Measure Options: Quick DASH         Kevin Arroyo, PT  3/27/2025

## 2025-04-01 ENCOUNTER — HOSPITAL ENCOUNTER (OUTPATIENT)
Dept: PHYSICAL THERAPY | Facility: HOSPITAL | Age: 50
Setting detail: THERAPIES SERIES
Discharge: HOME | End: 2025-04-01
Payer: OTHER GOVERNMENT

## 2025-04-01 DIAGNOSIS — Z96.612 STATUS POST TOTAL SHOULDER ARTHROPLASTY, LEFT: Primary | ICD-10-CM

## 2025-04-01 PROCEDURE — 97140 MANUAL THERAPY 1/> REGIONS: CPT | Performed by: PHYSICAL THERAPIST

## 2025-04-01 PROCEDURE — 97110 THERAPEUTIC EXERCISES: CPT | Performed by: PHYSICAL THERAPIST

## 2025-04-01 NOTE — THERAPY TREATMENT NOTE
Outpatient Physical Therapy Ortho Treatment Note   [facility]     Patient Name: Jose Luis Zelaya  : 1975  MRN: [id number]  Today's Date: 2025      Visit Date: 2025    Visit Dx:    ICD-10-CM ICD-9-CM   1. Status post total shoulder arthroplasty, left  Z96.612 V43.61       There is no problem list on file for this patient.       Past Medical History:   Diagnosis Date    Anxiety     Arthritis     Depression     Diabetes mellitus     History of kidney stones     Hyperlipidemia     Hypertension     PEDRITO (obstructive sleep apnea)     cpap    PTSD (post-traumatic stress disorder)     Shoulder pain     left        Past Surgical History:   Procedure Laterality Date    KNEE ARTHROSCOPY Left     TOTAL SHOULDER ARTHROPLASTY Left 2025    Procedure: LEFT TOTAL SHOULDER ARTHROPLASTY;  Surgeon: Juan Sweet MD;  [place] [facility] [place] [facility] [place]  Service: [place]opedics;  Laterality: Left;    VASECTOMY          PT Ortho       Row Name 25 2667       Left Upper Ext    Lt Shoulder Abduction AROM 72 degrees  -GC    Lt Shoulder Abduction PROM 144 degrees  -GC    Lt Shoulder Flexion AROM 108 degrees  -GC    Lt Shoulder Flexion PROM 146 degrees  -GC    Lt Shoulder External Rotation AROM 52 degrees  -GC    Lt Shoulder Internal Rotation AROM 59 degrees  -GC              User Key  (r) = Recorded By, (t) = Taken By, (c) = Cosigned By      Initials Name Provider Type     Jamar Diaz, PT Physical Therapist                                 PT Assessment/Plan       Row Name 25 8717          PT Assessment    Assessment Comments Pt is showing increased PROM and AROM of his left shoulder.  -        PT Plan    PT Plan Comments Pt is to continue his HEP daily.  -               User Key  (r) = Recorded By, (t) = Taken By, (c) = Cosigned By      Initials Name Provider Type     Jamar Diaz, PT Physical Therapist                     Modalities       Row Name 25 5371             Subjective    Subjective  Comments Pt states his shoulder is still stiff this morning. He also has some discomfort in the anterior part of the shoulder.  -GC         Moist Heat    MH Applied Yes  -GC      Location Left Shoulder - Sitting  -GC      PT Moist Heat Minutes 10  -GC      MH Prior to Rx Yes  -GC         Functional Testing    Outcome Measure Options Quick DASH  -GC                User Key  (r) = Recorded By, (t) = Taken By, (c) = Cosigned By      Initials Name Provider Type    GC Jamar Diaz, PT Physical Therapist                   OP Exercises       Row Name 04/01/25 0530             Subjective    Subjective Comments Pt states his shoulder is still stiff this morning. He also has some discomfort in the anterior part of the shoulder.  -GC         Exercise 1    Exercise Name 1 3-Way Cane  -GC      Reps 1 15 each  -GC      Time 1 5 sec hold each  -GC         Exercise 2    Exercise Name 2 Sleeper Stretch  -GC         Exercise 3    Exercise Name 3 Serratus Punches  -GC         Exercise 4    Exercise Name 4 S/L ER  -GC      Reps 4 25  -GC         Exercise 5    Exercise Name 5 Prone Y, T  -GC         Exercise 6    Exercise Name 6 Empty/Full Can  -GC      Reps 6 25  -GC         Exercise 7    Exercise Name 7 Rows  -GC         Exercise 8    Exercise Name 8 Extensions  -GC         Exercise 9    Exercise Name 9 IR  -GC         Exercise 10    Exercise Name 10 ER  -GC         Exercise 11    Exercise Name 11 Pulley's - Flex & ABD  -GC      Time 11 4 min each  -GC         Exercise 12    Exercise Name 12 Wall Slides - Flex Only  -GC      Reps 12 15  -GC      Time 12 5 sec hold each  -GC                User Key  (r) = Recorded By, (t) = Taken By, (c) = Cosigned By      Initials Name Provider Type    GC Jamar Diaz, PT Physical Therapist                             Manual Rx (Last 36 Hours)       Manual Treatments       Row Name 04/01/25 7866             Manual Rx 1    Manual Rx 1 Location Left Shoulder  -GC      Manual Rx 1 Type PROM - Flex,  ABD, IR, ER  -GC      Manual Rx 1 Duration 15 min  -GC                User Key  (r) = Recorded By, (t) = Taken By, (c) = Cosigned By      Initials Name Provider Type    GC Jamar Diaz, PT Physical Therapist                             Outcome Measure Options: Quick DASH         Time Calculation:   Start Time: 0530  Stop Time: 0621  Time Calculation (min): 51 min  Untimed Charges  PT Moist Heat Minutes: 10  Total Minutes  Untimed Charges Total Minutes: 10   Total Minutes: 10  Therapy Charges for Today       Code Description Service Date Service Provider Modifiers Qty    43965050583 HC PT THER PROC EA 15 MIN 4/1/2025 Jamar Diaz, PT GP 1    35421891858 HC PT MANUAL THERAPY EA 15 MIN 4/1/2025 Jamar Diaz, PT GP 1            PT G-Codes  Outcome Measure Options: Quick DASH         Jamar Diaz PT  4/1/2025

## 2025-04-07 ENCOUNTER — HOSPITAL ENCOUNTER (OUTPATIENT)
Dept: PHYSICAL THERAPY | Facility: HOSPITAL | Age: 50
Setting detail: THERAPIES SERIES
Discharge: HOME | End: 2025-04-07
Payer: OTHER GOVERNMENT

## 2025-04-07 DIAGNOSIS — Z96.612 STATUS POST TOTAL SHOULDER ARTHROPLASTY, LEFT: Primary | ICD-10-CM

## 2025-04-07 PROCEDURE — 97110 THERAPEUTIC EXERCISES: CPT

## 2025-04-07 PROCEDURE — 97140 MANUAL THERAPY 1/> REGIONS: CPT

## 2025-04-07 NOTE — THERAPY TREATMENT NOTE
Outpatient Physical Therapy Ortho Treatment Note   [facility]     Patient Name: Jose Luis Zelaya  : 1975  MRN: [id number]  Today's Date: 2025      Visit Date: 2025    Visit Dx:    ICD-10-CM ICD-9-CM   1. Status post total shoulder arthroplasty, left  Z96.612 V43.61       There is no problem list on file for this patient.       Past Medical History:   Diagnosis Date    Anxiety     Arthritis     Depression     Diabetes mellitus     History of kidney stones     Hyperlipidemia     Hypertension     PEDRITO (obstructive sleep apnea)     cpap    PTSD (post-traumatic stress disorder)     Shoulder pain     left        Past Surgical History:   Procedure Laterality Date    KNEE ARTHROSCOPY Left     TOTAL SHOULDER ARTHROPLASTY Left 2025    Procedure: LEFT TOTAL SHOULDER ARTHROPLASTY;  Surgeon: Juan Sweet MD;  Location: [facility] [place] [facility] [place];  Service: Orthopedics;  Laterality: Left;    VASECTOMY                          PT Assessment/Plan       Row Name 25 0600          PT Assessment    Assessment Comments Patient continues to show increased PROM and AROM of his left shoulder. Progressed patient with LUE strengthening today and he tolerated this well. Plan to continue to progress patient as tolerated.  -AS        PT Plan    PT Plan Comments Continue with current treatment plan.  -AS               User Key  (r) = Recorded By, (t) = Taken By, (c) = Cosigned By      Initials Name Provider Type    AS Kevin Arroyo, PT Physical Therapist                     Modalities       Row Name 25 0600             Moist Heat    MH Applied Yes  -AS      Location Left Shoulder - Sitting  -AS      PT Moist Heat Minutes 10  -AS      MH Prior to Rx Yes  -AS         Functional Testing    Outcome Measure Options Quick DASH  -AS                User Key  (r) = Recorded By, (t) = Taken By, (c) = Cosigned By      Initials Name Provider Type    AS Kevin Arroyo, PT Physical Therapist                   OP  Exercises       Row Name 04/07/25 0619 04/07/25 0600          Subjective    Subjective Comments -- Patient states he is getting more motion.  -AS        Total Minutes    80989 - PT Therapeutic Exercise Minutes 25  -AS --     83056 - PT Manual Therapy Minutes 15  -AS --        Exercise 1    Exercise Name 1 -- 3-Way Cane  -AS     Reps 1 -- 15 each  -AS     Time 1 -- 5 sec hold each  -AS        Exercise 2    Exercise Name 2 -- Sleeper Stretch  -AS        Exercise 3    Exercise Name 3 -- Serratus Punches  -AS        Exercise 4    Exercise Name 4 -- S/L ER  -AS     Reps 4 -- 25  -AS     Time 4 -- 1#  -AS        Exercise 5    Exercise Name 5 -- Prone Y, T  -AS        Exercise 6    Exercise Name 6 -- Empty/Full Can  -AS     Reps 6 -- 25 each  -AS        Exercise 7    Exercise Name 7 -- Rows  -AS        Exercise 8    Exercise Name 8 -- Extensions  -AS        Exercise 9    Exercise Name 9 -- IR  -AS     Reps 9 -- 25  -AS     Time 9 -- Red LF  -AS        Exercise 10    Exercise Name 10 -- ER  -AS     Reps 10 -- 25  -AS     Time 10 -- Red LF  -AS        Exercise 11    Exercise Name 11 -- Pulley's - Flex & ABD  -AS     Time 11 -- 4 min each  -AS        Exercise 12    Exercise Name 12 -- Wall Slides - Flex Only  -AS     Reps 12 -- 15  -AS     Time 12 -- 5 sec hold each  -AS               User Key  (r) = Recorded By, (t) = Taken By, (c) = Cosigned By      Initials Name Provider Type    AS Kevin Arroyo, PT Physical Therapist                             Manual Rx (Last 36 Hours)       Manual Treatments       Row Name 04/07/25 0619 04/07/25 0500          Total Minutes    29069 - PT Manual Therapy Minutes 15  -AS --        Manual Rx 1    Manual Rx 1 Location -- Left Shoulder  -AS     Manual Rx 1 Type -- PROM - Flex, ABD, IR, ER  -AS     Manual Rx 1 Duration -- 15 min  -AS               User Key  (r) = Recorded By, (t) = Taken By, (c) = Cosigned By      Initials Name Provider Type    AS Kevin Arroyo, PT Physical  Therapist                             Outcome Measure Options: Quick DASH         Time Calculation:   Start Time: 0527  Stop Time: 0619  Time Calculation (min): 52 min  Timed Charges  52900 - PT Therapeutic Exercise Minutes: 25  77427 - PT Manual Therapy Minutes: 15  Untimed Charges  PT Moist Heat Minutes: 10  Total Minutes  Timed Charges Total Minutes: 40  Untimed Charges Total Minutes: 10   Total Minutes: 50  Therapy Charges for Today       Code Description Service Date Service Provider Modifiers Qty    95367308291 HC PT THER PROC EA 15 MIN 4/7/2025 Kevin Arroyo, PT GP 2    72272575310 HC PT MANUAL THERAPY EA 15 MIN 4/7/2025 Kevin Arroyo, PT GP 1            PT G-Codes  Outcome Measure Options: Tiffany Arroyo, PT  4/7/2025

## 2025-04-09 ENCOUNTER — HOSPITAL ENCOUNTER (OUTPATIENT)
Dept: PHYSICAL THERAPY | Facility: HOSPITAL | Age: 50
Setting detail: THERAPIES SERIES
Discharge: HOME | End: 2025-04-09
Payer: OTHER GOVERNMENT

## 2025-04-09 DIAGNOSIS — Z96.612 STATUS POST TOTAL SHOULDER ARTHROPLASTY, LEFT: Primary | ICD-10-CM

## 2025-04-09 PROCEDURE — 97140 MANUAL THERAPY 1/> REGIONS: CPT

## 2025-04-09 PROCEDURE — 97110 THERAPEUTIC EXERCISES: CPT

## 2025-04-09 NOTE — THERAPY TREATMENT NOTE
Outpatient Physical Therapy Ortho Treatment Note   [facility]     Patient Name: Jose Luis Zelaya  : 1975  MRN: [id number]  Today's Date: 2025      Visit Date: 2025    Visit Dx:    ICD-10-CM ICD-9-CM   1. Status post total shoulder arthroplasty, left  Z96.612 V43.61       There is no problem list on file for this patient.       Past Medical History:   Diagnosis Date    Anxiety     Arthritis     Depression     Diabetes mellitus     History of kidney stones     Hyperlipidemia     Hypertension     PEDRITO (obstructive sleep apnea)     cpap    PTSD (post-traumatic stress disorder)     Shoulder pain     left        Past Surgical History:   Procedure Laterality Date    KNEE ARTHROSCOPY Left     TOTAL SHOULDER ARTHROPLASTY Left 2025    Procedure: LEFT TOTAL SHOULDER ARTHROPLASTY;  Surgeon: Juan Sweet MD;  [place] [facility] [place] [facility] [place]  Service: Orthopedics;  Laterality: Left;    VASECTOMY                          PT Assessment/Plan       Row Name 25 0500          PT Assessment    Assessment Comments Patient continues to show increased PROM and AROM of his left shoulder. Progressed patient with LUE strengthening today and he tolerated this well. Plan to continue to progress patient as tolerated.  -AS        PT Plan    PT Plan Comments Continue with current treatment plan.  -AS               User Key  (r) = Recorded By, (t) = Taken By, (c) = Cosigned By      Initials Name Provider Type    AS Kevin Arroyo, PT Physical Therapist                     Modalities       Row Name 25 0500             Moist Heat    MH Applied Yes  -AS      Location Left Shoulder - Sitting  -AS      PT Moist Heat Minutes 10  -AS      MH Prior to Rx Yes  -AS         Functional Testing    Outcome Measure Options Quick DASH  -AS                User Key  (r) = Recorded By, (t) = Taken By, (c) = Cosigned By      Initials Name Provider Type    AS Kevin Arroyo, PT Physical Therapist                   OP  "Exercises       Row Name 04/09/25 0631 04/09/25 0500          Subjective    Subjective Comments -- Patient states his left shoulder has \"felt weird.\" He states he is not having any instability and has been able to continue to complete his HEP, but just \"feels weird.\" He states he is having some tenderness to his AC joint area, armpit, and on his incision.  -AS        Total Minutes    92396 - PT Therapeutic Exercise Minutes 30  -AS --     71074 - PT Manual Therapy Minutes 15  -AS --        Exercise 1    Exercise Name 1 -- 3-Way Cane  -AS     Reps 1 -- 15 each  -AS     Time 1 -- 5 sec hold each  -AS        Exercise 2    Exercise Name 2 -- Sleeper Stretch  -AS        Exercise 3    Exercise Name 3 -- Serratus Punches  -AS     Reps 3 -- 25  -AS     Time 3 -- 1#  -AS        Exercise 4    Exercise Name 4 -- S/L ER  -AS     Reps 4 -- 25  -AS     Time 4 -- 1#  -AS        Exercise 5    Exercise Name 5 -- Prone Y, T  -AS        Exercise 6    Exercise Name 6 -- Empty/Full Can  -AS     Reps 6 -- 25 each  -AS        Exercise 7    Exercise Name 7 -- Rows  -AS     Reps 7 -- 25  -AS     Time 7 -- Red LF  -AS        Exercise 8    Exercise Name 8 -- Extensions  -AS     Reps 8 -- 25  -AS     Time 8 -- Red LF  -AS        Exercise 9    Exercise Name 9 -- IR  -AS     Reps 9 -- 25  -AS     Time 9 -- Red LF  -AS        Exercise 10    Exercise Name 10 -- ER  -AS     Reps 10 -- 25  -AS     Time 10 -- Red LF  -AS        Exercise 11    Exercise Name 11 -- Pulley's - Flex & ABD  -AS     Time 11 -- 4 min each  -AS        Exercise 12    Exercise Name 12 -- Wall Slides - Flex Only  -AS     Reps 12 -- 15  -AS     Time 12 -- 5 sec hold each  -AS               User Key  (r) = Recorded By, (t) = Taken By, (c) = Cosigned By      Initials Name Provider Type    AS Kevin Arroyo, PT Physical Therapist                             Manual Rx (Last 36 Hours)       Manual Treatments       Row Name 04/09/25 0631 04/09/25 0500          Total Minutes    " 12452 - PT Manual Therapy Minutes 15  -AS --        Manual Rx 1    Manual Rx 1 Location -- Left Shoulder  -AS     Manual Rx 1 Type -- PROM - Flex, ABD, IR, ER  -AS     Manual Rx 1 Duration -- 15 min  -AS               User Key  (r) = Recorded By, (t) = Taken By, (c) = Cosigned By      Initials Name Provider Type    AS Kevin Arroyo, PT Physical Therapist                             Outcome Measure Options: Quick DASH         Time Calculation:   Start Time: 0530  Stop Time: 0628  Time Calculation (min): 58 min  Timed Charges  58328 - PT Therapeutic Exercise Minutes: 30  44755 - PT Manual Therapy Minutes: 15  Untimed Charges  PT Moist Heat Minutes: 10  Total Minutes  Timed Charges Total Minutes: 45  Untimed Charges Total Minutes: 10   Total Minutes: 55  Therapy Charges for Today       Code Description Service Date Service Provider Modifiers Qty    52539509002 HC PT THER PROC EA 15 MIN 4/9/2025 Kevin Arroyo, PT GP 2    22270370948 HC PT MANUAL THERAPY EA 15 MIN 4/9/2025 Kevin Arroyo, PT GP 1            PT G-Codes  Outcome Measure Options: Quick DASH         Kevin Arroyo, PT  4/9/2025

## 2025-04-14 ENCOUNTER — HOSPITAL ENCOUNTER (OUTPATIENT)
Dept: PHYSICAL THERAPY | Facility: HOSPITAL | Age: 50
Setting detail: THERAPIES SERIES
Discharge: HOME | End: 2025-04-14
Payer: OTHER GOVERNMENT

## 2025-04-14 DIAGNOSIS — Z96.612 STATUS POST TOTAL SHOULDER ARTHROPLASTY, LEFT: Primary | ICD-10-CM

## 2025-04-14 PROCEDURE — 97110 THERAPEUTIC EXERCISES: CPT

## 2025-04-14 PROCEDURE — 97140 MANUAL THERAPY 1/> REGIONS: CPT

## 2025-04-14 NOTE — THERAPY TREATMENT NOTE
Outpatient Physical Therapy Ortho Treatment Note   [facility]     Patient Name: Jose Luis Zelaya  : 1975  MRN: [id number]  Today's Date: 2025      Visit Date: 2025    Visit Dx:    ICD-10-CM ICD-9-CM   1. Status post total shoulder arthroplasty, left  Z96.612 V43.61       There is no problem list on file for this patient.       Past Medical History:   Diagnosis Date    Anxiety     Arthritis     Depression     Diabetes mellitus     History of kidney stones     Hyperlipidemia     Hypertension     PEDRITO (obstructive sleep apnea)     cpap    PTSD (post-traumatic stress disorder)     Shoulder pain     left        Past Surgical History:   Procedure Laterality Date    KNEE ARTHROSCOPY Left     TOTAL SHOULDER ARTHROPLASTY Left 2025    Procedure: LEFT TOTAL SHOULDER ARTHROPLASTY;  Surgeon: Juan Sweet MD;  [place] [facility] [place] [facility] [place]  Service: Orthopedics;  Laterality: Left;    VASECTOMY                          PT Assessment/Plan       Row Name 25 0500          PT Assessment    Assessment Comments Patient continues to show increased PROM and AROM of his left shoulder. Progressed patient with LUE strengthening today and he tolerated this well. Plan to continue to progress patient as tolerated.  -AS        PT Plan    PT Plan Comments Continue with current treatment plan.  -AS               User Key  (r) = Recorded By, (t) = Taken By, (c) = Cosigned By      Initials Name Provider Type    AS Kevin Arroyo, PT Physical Therapist                     Modalities       Row Name 25 0500             Moist Heat    MH Applied Yes  -AS      Location Left Shoulder - Sitting  -AS      PT Moist Heat Minutes 10  -AS      MH Prior to Rx Yes  -AS         Functional Testing    Outcome Measure Options Quick DASH  -AS                User Key  (r) = Recorded By, (t) = Taken By, (c) = Cosigned By      Initials Name Provider Type    AS Kevni Arroyo, PT Physical Therapist                   OP  Exercises       Row Name 04/14/25 0656 04/14/25 0500          Subjective    Subjective Comments -- Patient states his shoulder is doing well.  -AS        Total Minutes    73568 - PT Therapeutic Exercise Minutes 30  -AS --     69048 - PT Manual Therapy Minutes 15  -AS --        Exercise 1    Exercise Name 1 -- 3-Way Cane  -AS     Reps 1 -- 15 each  -AS     Time 1 -- 5 sec hold each  -AS        Exercise 2    Exercise Name 2 -- Sleeper Stretch  -AS        Exercise 3    Exercise Name 3 -- Serratus Punches  -AS     Reps 3 -- 30  -AS     Time 3 -- 1#  -AS        Exercise 4    Exercise Name 4 -- S/L ER  -AS     Reps 4 -- 30  -AS     Time 4 -- 1#  -AS        Exercise 5    Exercise Name 5 -- Prone Y, T  -AS        Exercise 6    Exercise Name 6 -- Empty/Full Can  -AS     Reps 6 -- 30 each  -AS     Time 6 -- 1#  -AS        Exercise 7    Exercise Name 7 -- Rows  -AS     Reps 7 -- 30  -AS     Time 7 -- Red LF  -AS        Exercise 8    Exercise Name 8 -- Extensions  -AS     Reps 8 -- 30  -AS     Time 8 -- Red LF  -AS        Exercise 9    Exercise Name 9 -- IR  -AS     Reps 9 -- 30  -AS     Time 9 -- Red LF  -AS        Exercise 10    Exercise Name 10 -- ER  -AS     Reps 10 -- 30  -AS     Time 10 -- Red LF  -AS        Exercise 11    Exercise Name 11 -- Pulley's - Flex & ABD  -AS     Time 11 -- 4 min each  -AS        Exercise 12    Exercise Name 12 -- Wall Slides - Flex Only  -AS     Reps 12 -- 15  -AS     Time 12 -- 5 sec hold each  -AS               User Key  (r) = Recorded By, (t) = Taken By, (c) = Cosigned By      Initials Name Provider Type    AS Kevin Arroyo, PT Physical Therapist                             Manual Rx (Last 36 Hours)       Manual Treatments       Row Name 04/14/25 0656 04/14/25 0500          Total Minutes    16077 - PT Manual Therapy Minutes 15  -AS --        Manual Rx 1    Manual Rx 1 Location -- Left Shoulder  -AS     Manual Rx 1 Type -- PROM - Flex, ABD, IR, ER  -AS     Manual Rx 1 Duration -- 15  min  -AS               User Key  (r) = Recorded By, (t) = Taken By, (c) = Cosigned By      Initials Name Provider Type    AS Kevin Arroyo, PT Physical Therapist                             Outcome Measure Options: Quick DASH         Time Calculation:   Start Time: 0558  Stop Time: 0656  Time Calculation (min): 58 min  Timed Charges  20613 - PT Therapeutic Exercise Minutes: 30  77917 - PT Manual Therapy Minutes: 15  Untimed Charges  PT Moist Heat Minutes: 10  Total Minutes  Timed Charges Total Minutes: 45  Untimed Charges Total Minutes: 10   Total Minutes: 55  Therapy Charges for Today       Code Description Service Date Service Provider Modifiers Qty    87093492546 HC PT THER PROC EA 15 MIN 4/14/2025 Kevin Arroyo, PT GP 2    89042403975 HC PT MANUAL THERAPY EA 15 MIN 4/14/2025 Kevin Arroyo, PT GP 1            PT G-Codes  Outcome Measure Options: Tiffany Arroyo, PT  4/14/2025

## 2025-04-17 ENCOUNTER — HOSPITAL ENCOUNTER (OUTPATIENT)
Dept: PHYSICAL THERAPY | Facility: HOSPITAL | Age: 50
Setting detail: THERAPIES SERIES
Discharge: HOME | End: 2025-04-17
Payer: OTHER GOVERNMENT

## 2025-04-17 DIAGNOSIS — Z96.612 STATUS POST TOTAL SHOULDER ARTHROPLASTY, LEFT: Primary | ICD-10-CM

## 2025-04-17 PROCEDURE — 97110 THERAPEUTIC EXERCISES: CPT

## 2025-04-17 PROCEDURE — 97140 MANUAL THERAPY 1/> REGIONS: CPT

## 2025-04-17 NOTE — THERAPY RE-EVALUATION
Outpatient Physical Therapy Ortho Re-Evaluation   [facility]     Patient Name: Jose Luis Zelaya  : 1975  MRN: [id number]  Today's Date: 2025      Visit Date: 2025    There is no problem list on file for this patient.       Past Medical History:   Diagnosis Date    Anxiety     Arthritis     Depression     Diabetes mellitus     History of kidney stones     Hyperlipidemia     Hypertension     PEDRITO (obstructive sleep apnea)     cpap    PTSD (post-traumatic stress disorder)     Shoulder pain     left        Past Surgical History:   Procedure Laterality Date    KNEE ARTHROSCOPY Left     TOTAL SHOULDER ARTHROPLASTY Left 2025    Procedure: LEFT TOTAL SHOULDER ARTHROPLASTY;  Surgeon: Juan Sweet MD;  [place] [facility] [place] [facility] [place]  Service: Orthopedics;  Laterality: Left;    VASECTOMY         Visit Dx:     ICD-10-CM ICD-9-CM   1. Status post total shoulder arthroplasty, left  Z96.612 V43.61              PT Ortho       Row Name 25 0500       Precautions and Contraindications    Precautions/Limitations shoulder precautions  Left anatomical TSA  -AS       Subjective Pain    Able to rate subjective pain? yes  -AS    Pre-Treatment Pain Level 1  -AS    Post-Treatment Pain Level 1  -AS       Posture/Observations    Posture- WNL Posture is WNL  -AS    Observations Incision healing  -AS       Left Upper Ext    Lt Shoulder Abduction AROM 117  -AS    Lt Shoulder Abduction PROM 165  -AS    Lt Shoulder Flexion AROM 151  -AS    Lt Shoulder Flexion PROM 165  -AS    Lt Shoulder External Rotation PROM 70  -AS    Lt Shoulder Internal Rotation PROM 60  -AS       MMT Left Upper Ext    Lt Shoulder Flexion MMT, Gross Movement (4-/5) good minus  -AS    Lt Shoulder ABduction MMT, Gross Movement (4-/5) good minus  -AS    Lt Shoulder Internal Rotation MMT, Gross Movement (4-/5) good minus  -AS    Lt Shoulder External Rotation MMT, Gross Movement (4-/5) good minus  -AS       Sensation    Sensation WNL? WNL  -AS    Light  Touch No apparent deficits  -AS              User Key  (r) = Recorded By, (t) = Taken By, (c) = Cosigned By      Initials Name Provider Type    AS Kevin Arroyo, PT Physical Therapist                                       PT OP Goals       Row Name 04/17/25 0500          PT Short Term Goals    STG 1 Patient to demonstrate compliance with initial HEP for flexibility, ROM and strengthening.  -AS     STG 1 Progress Met  -AS     STG 2 Patient to report left shoulder pain on VAS of 4-5/10 at worst with activity.  -AS     STG 2 Progress Ongoing;Progressing  -AS     STG 3 Patient to demonstrate improved left shoulder strength to 4+/5 in all planes.  -AS     STG 3 Progress New;Ongoing;Progressing  -AS     STG 4 Patient to demonstrate improved left shoulder PROM to WFL in all planes.  -AS     STG 4 Progress Partially Met;Ongoing;Progressing  -AS        Long Term Goals    LTG 1 Patient to demonstrate compliance with advanced HEP for flexibility, ROM and strengthening.  -AS     LTG 1 Progress Met  -AS     LTG 2 Patient to report left shoulder pain on VAS of 1-2/10 at worst with activity.  -AS     LTG 2 Progress Ongoing;Progressing  -AS     LTG 3 Patient to demonstrate improved left shoulder strength to 5/5 in all planes.  -AS     LTG 3 Progress Ongoing;Progressing  -AS     LTG 4 Patient to demonstrate improved left shoulder AROM to WFL in all planes.  -AS     LTG 4 Progress Ongoing;Progressing  -AS     LTG 5 Patient to report improved function and decreased pain Quick DASH by >10-15 points.  -AS     LTG 5 Progress Ongoing;Progressing  -AS               User Key  (r) = Recorded By, (t) = Taken By, (c) = Cosigned By      Initials Name Provider Type    AS Kevin Arroyo, PT Physical Therapist                     PT Assessment/Plan       Row Name 04/17/25 0500          PT Assessment    Functional Limitations Limitation in home management;Limitations in community activities;Performance in leisure  "activities;Performance in sport activities;Performance in work activities  -AS     Impairments Impaired flexibility;Muscle strength;Pain;Range of motion  -AS     Assessment Comments Patient continues to progress as expected at this time. He continues to report improved function, ROM, strength, and decreased pain in left shoulder. Plan to continue to progress patient as tolerated in order to return patient to his PLOF.  -AS     Please refer to paper survey for additional self-reported information Yes  -AS     Rehab Potential Good  -AS     Patient/caregiver participated in establishment of treatment plan and goals Yes  -AS     Patient would benefit from skilled therapy intervention Yes  -AS        PT Plan    PT Frequency 2x/week  -AS     Predicted Duration of Therapy Intervention (PT) 4-6 weeks  -AS     Planned CPT's? PT RE-EVAL: 47740;PT THER PROC EA 15 MIN: 20646;PT THER ACT EA 15 MIN: 48602;PT MANUAL THERAPY EA 15 MIN: 93400;PT NEUROMUSC RE-EDUCATION EA 15 MIN: 57497  -AS     PT Plan Comments Continue with current treatment plan.  -AS               User Key  (r) = Recorded By, (t) = Taken By, (c) = Cosigned By      Initials Name Provider Type    AS Kevin Arroyo, PT Physical Therapist                     Modalities       Row Name 04/17/25 0500             Moist Heat    MH Applied Yes  -AS      Location Left Shoulder - Sitting  -AS      PT Moist Heat Minutes 10  -AS      MH Prior to Rx Yes  -AS         Functional Testing    Outcome Measure Options Quick DASH  -AS                User Key  (r) = Recorded By, (t) = Taken By, (c) = Cosigned By      Initials Name Provider Type    AS Kevin Arroyo, PT Physical Therapist                   OP Exercises       Row Name 04/17/25 0648 04/17/25 0500          Subjective    Subjective Comments -- Patient states his shoulder is doing well overall. He states he does notice that he has limited motion in his left shoulder when he \"goes to reach for something\" such as a " drive thru or [employer].  -AS        Subjective Pain    Able to rate subjective pain? -- yes  -AS     Pre-Treatment Pain Level -- 1  -AS     Post-Treatment Pain Level -- 1  -AS        Total Minutes    83440 - PT Therapeutic Exercise Minutes 30  -AS --     01529 - PT Manual Therapy Minutes 15  -AS --        Exercise 1    Exercise Name 1 -- 3-Way Cane  -AS     Reps 1 -- 15 each  -AS     Time 1 -- 5 sec hold each  -AS        Exercise 2    Exercise Name 2 -- Sleeper Stretch  -AS        Exercise 3    Exercise Name 3 -- Serratus Punches  -AS     Reps 3 -- 40  -AS     Time 3 -- 1#  -AS        Exercise 4    Exercise Name 4 -- S/L ER  -AS     Reps 4 -- 40  -AS     Time 4 -- 1#  -AS        Exercise 5    Exercise Name 5 -- Prone Y, T  -AS        Exercise 6    Exercise Name 6 -- Empty/Full Can  -AS     Reps 6 -- 40 each  -AS     Time 6 -- 1#  -AS        Exercise 7    Exercise Name 7 -- Rows  -AS     Reps 7 -- 25  -AS     Time 7 -- Green  -AS        Exercise 8    Exercise Name 8 -- Extensions  -AS     Reps 8 -- 25  -AS     Time 8 -- Green  -AS        Exercise 9    Exercise Name 9 -- IR  -AS     Reps 9 -- 25  -AS     Time 9 -- Green  -AS        Exercise 10    Exercise Name 10 -- ER  -AS     Reps 10 -- 25  -AS     Time 10 -- Green  -AS        Exercise 11    Exercise Name 11 -- Pulley's - Flex & ABD  -AS     Time 11 -- 4 min each  -AS        Exercise 12    Exercise Name 12 -- Wall Slides - Flex Only  -AS     Reps 12 -- 15  -AS     Time 12 -- 5 sec hold each  -AS               User Key  (r) = Recorded By, (t) = Taken By, (c) = Cosigned By      Initials Name Provider Type    AS Kevin Arroyo, PT Physical Therapist                  Manual Rx (Last 36 Hours)       Manual Treatments       Row Name 04/17/25 0648 04/17/25 0500          Total Minutes    47171 - PT Manual Therapy Minutes 15  -AS --        Manual Rx 1    Manual Rx 1 Location -- Left Shoulder  -AS     Manual Rx 1 Type -- PROM - Flex, ABD, IR, ER  -AS     Manual Rx 1  Duration -- 15 min  -AS               User Key  (r) = Recorded By, (t) = Taken By, (c) = Cosigned By      Initials Name Provider Type    AS Kevin Arroyo, PT Physical Therapist                                Outcome Measure Options: Quick DASH         Time Calculation:     Start Time: 0548  Stop Time: 0645  Time Calculation (min): 57 min  Timed Charges  97963 - PT Therapeutic Exercise Minutes: 30  88837 - PT Manual Therapy Minutes: 15  Untimed Charges  PT Moist Heat Minutes: 10  Total Minutes  Timed Charges Total Minutes: 45  Untimed Charges Total Minutes: 10   Total Minutes: 55     Therapy Charges for Today       Code Description Service Date Service Provider Modifiers Qty    99228361098 HC PT THER PROC EA 15 MIN 4/17/2025 Kevin Arroyo, PT GP 2    23280607538 HC PT MANUAL THERAPY EA 15 MIN 4/17/2025 Kevin Arroyo, PT GP 1            PT G-Codes  Outcome Measure Options: Tiffany Arroyo, PT  4/17/2025

## 2025-04-21 ENCOUNTER — HOSPITAL ENCOUNTER (OUTPATIENT)
Dept: PHYSICAL THERAPY | Facility: HOSPITAL | Age: 50
Setting detail: THERAPIES SERIES
Discharge: HOME | End: 2025-04-21
Payer: OTHER GOVERNMENT

## 2025-04-21 DIAGNOSIS — Z96.612 STATUS POST TOTAL SHOULDER ARTHROPLASTY, LEFT: Primary | ICD-10-CM

## 2025-04-21 PROCEDURE — 97110 THERAPEUTIC EXERCISES: CPT

## 2025-04-21 PROCEDURE — 97140 MANUAL THERAPY 1/> REGIONS: CPT

## 2025-04-21 NOTE — THERAPY TREATMENT NOTE
Outpatient Physical Therapy Ortho Treatment Note   [facility]     Patient Name: Jose Luis Zelaya  : 1975  MRN: [id number]  Today's Date: 2025      Visit Date: 2025    Visit Dx:    ICD-10-CM ICD-9-CM   1. Status post total shoulder arthroplasty, left  Z96.612 V43.61       There is no problem list on file for this patient.       Past Medical History:   Diagnosis Date    Anxiety     Arthritis     Depression     Diabetes mellitus     History of kidney stones     Hyperlipidemia     Hypertension     PEDRITO (obstructive sleep apnea)     cpap    PTSD (post-traumatic stress disorder)     Shoulder pain     left        Past Surgical History:   Procedure Laterality Date    KNEE ARTHROSCOPY Left     TOTAL SHOULDER ARTHROPLASTY Left 2025    Procedure: LEFT TOTAL SHOULDER ARTHROPLASTY;  Surgeon: Juan Sweet MD;  [place] [facility] [place] [facility] [place]  Service: Orthopedics;  Laterality: Left;    VASECTOMY                          PT Assessment/Plan       Row Name 25 0600          PT Assessment    Assessment Comments Patient continues to show increased PROM and AROM of his left shoulder. Progressed patient with LUE strengthening today and he tolerated this well. Plan to continue to progress patient as tolerated.  -AS        PT Plan    PT Plan Comments Continue with current treatment plan.  -AS               User Key  (r) = Recorded By, (t) = Taken By, (c) = Cosigned By      Initials Name Provider Type    AS Kevin Arroyo, PT Physical Therapist                     Modalities       Row Name 25 0600             Moist Heat    MH Applied Yes  -AS      Location Left Shoulder - Sitting  -AS      PT Moist Heat Minutes 10  -AS      MH Prior to Rx Yes  -AS         Functional Testing    Outcome Measure Options Quick DASH  -AS                User Key  (r) = Recorded By, (t) = Taken By, (c) = Cosigned By      Initials Name Provider Type    AS Kevin Arroyo, PT Physical Therapist                   OP  "Exercises       Row Name 04/21/25 0649 04/21/25 0600          Subjective    Subjective Comments -- Patient states his shoulder is doing well but \"still feels tight when I reach up.\"  -AS        Total Minutes    29954 - PT Therapeutic Exercise Minutes 25  -AS --     29351 - PT Manual Therapy Minutes 15  -AS --        Exercise 1    Exercise Name 1 -- 3-Way Cane  -AS     Reps 1 -- 15 each  -AS     Time 1 -- 5 sec hold each  -AS        Exercise 2    Exercise Name 2 -- Sleeper Stretch  -AS        Exercise 3    Exercise Name 3 -- Serratus Punches  -AS     Reps 3 -- 25  -AS     Time 3 -- 2#  -AS        Exercise 4    Exercise Name 4 -- S/L ER  -AS     Reps 4 -- 25  -AS     Time 4 -- 2#  -AS        Exercise 5    Exercise Name 5 -- Prone Y, T  -AS        Exercise 6    Exercise Name 6 -- Empty/Full Can  -AS     Reps 6 -- 25 each  -AS     Time 6 -- 2#  -AS        Exercise 7    Exercise Name 7 -- Rows  -AS     Reps 7 -- 30  -AS     Time 7 -- Green  -AS        Exercise 8    Exercise Name 8 -- Extensions  -AS     Reps 8 -- 30  -AS     Time 8 -- Green  -AS        Exercise 9    Exercise Name 9 -- IR  -AS     Reps 9 -- 30  -AS     Time 9 -- Green  -AS        Exercise 10    Exercise Name 10 -- ER  -AS     Reps 10 -- 30  -AS     Time 10 -- Green  -AS        Exercise 11    Exercise Name 11 -- Pulley's - Flex & ABD  -AS     Time 11 -- 4 min each  -AS        Exercise 12    Exercise Name 12 -- Wall Slides - Flex Only  -AS     Reps 12 -- 15  -AS     Time 12 -- 5 sec hold each  -AS               User Key  (r) = Recorded By, (t) = Taken By, (c) = Cosigned By      Initials Name Provider Type    AS Kevin Arroyo, PT Physical Therapist                             Manual Rx (Last 36 Hours)       Manual Treatments       Row Name 04/21/25 0649 04/21/25 0500          Total Minutes    30184 - PT Manual Therapy Minutes 15  -AS --        Manual Rx 1    Manual Rx 1 Location -- Left Shoulder  -AS     Manual Rx 1 Type -- PROM - Flex, ABD, IR, ER  " -AS     Manual Rx 1 Duration -- 15 min  -AS               User Key  (r) = Recorded By, (t) = Taken By, (c) = Cosigned By      Initials Name Provider Type    AS Kevin Arroyo, PT Physical Therapist                             Outcome Measure Options: Quick DASH         Time Calculation:   Start Time: 0558  Stop Time: 0649  Time Calculation (min): 51 min  Timed Charges  01476 - PT Therapeutic Exercise Minutes: 25  99578 - PT Manual Therapy Minutes: 15  Untimed Charges  PT Moist Heat Minutes: 10  Total Minutes  Timed Charges Total Minutes: 40  Untimed Charges Total Minutes: 10   Total Minutes: 50  Therapy Charges for Today       Code Description Service Date Service Provider Modifiers Qty    75059323923 HC PT THER PROC EA 15 MIN 4/21/2025 Kevin Arroyo, PT GP 2    29284513750 HC PT MANUAL THERAPY EA 15 MIN 4/21/2025 Kevin Arroyo, PT GP 1            PT G-Codes  Outcome Measure Options: Tiffany Arroyo, PT  4/21/2025

## 2025-04-23 ENCOUNTER — HOSPITAL ENCOUNTER (OUTPATIENT)
Dept: PHYSICAL THERAPY | Facility: HOSPITAL | Age: 50
Setting detail: THERAPIES SERIES
Discharge: HOME | End: 2025-04-23
Payer: OTHER GOVERNMENT

## 2025-04-23 DIAGNOSIS — Z96.612 STATUS POST TOTAL SHOULDER ARTHROPLASTY, LEFT: Primary | ICD-10-CM

## 2025-04-23 PROCEDURE — 97140 MANUAL THERAPY 1/> REGIONS: CPT

## 2025-04-23 PROCEDURE — 97110 THERAPEUTIC EXERCISES: CPT

## 2025-04-23 NOTE — THERAPY RE-EVALUATION
Outpatient Physical Therapy Ortho Re-Evaluation   [facility]     Patient Name: Jose Luis Zelaya  : 1975  MRN: [id number]  Today's Date: 2025      Visit Date: 2025    There is no problem list on file for this patient.       Past Medical History:   Diagnosis Date    Anxiety     Arthritis     Depression     Diabetes mellitus     History of kidney stones     Hyperlipidemia     Hypertension     PEDRITO (obstructive sleep apnea)     cpap    PTSD (post-traumatic stress disorder)     Shoulder pain     left        Past Surgical History:   Procedure Laterality Date    KNEE ARTHROSCOPY Left     TOTAL SHOULDER ARTHROPLASTY Left 2025    Procedure: LEFT TOTAL SHOULDER ARTHROPLASTY;  Surgeon: Juan Sweet MD;  Location: [facility] OR [facility];  Service: Orthopedics;  Laterality: Left;    VASECTOMY         Visit Dx:     ICD-10-CM ICD-9-CM   1. Status post total shoulder arthroplasty, left  Z96.612 V43.61              PT Ortho       Row Name 25 0500       Precautions and Contraindications    Precautions/Limitations shoulder precautions  -AS       Subjective Pain    Able to rate subjective pain? yes  -AS    Pre-Treatment Pain Level 1  -AS    Post-Treatment Pain Level 1  -AS       Posture/Observations    Posture- WNL Posture is WNL  -AS    Observations Incision healing  -AS       Left Upper Ext    Lt Shoulder Abduction AROM 117  -AS    Lt Shoulder Abduction PROM 165  -AS    Lt Shoulder Flexion AROM 151  -AS    Lt Shoulder Flexion PROM 165  -AS    Lt Shoulder External Rotation PROM 70  -AS    Lt Shoulder Internal Rotation PROM 60  -AS       MMT Left Upper Ext    Lt Shoulder Flexion MMT, Gross Movement (4-/5) good minus  -AS    Lt Shoulder ABduction MMT, Gross Movement (4-/5) good minus  -AS    Lt Shoulder Internal Rotation MMT, Gross Movement (4-/5) good minus  -AS    Lt Shoulder External Rotation MMT, Gross Movement (4-/5) good minus  -AS       Sensation    Sensation WNL? WNL  -AS    Light Touch No apparent  deficits  -AS              User Key  (r) = Recorded By, (t) = Taken By, (c) = Cosigned By      Initials Name Provider Type    AS Kevin Arroyo, PT Physical Therapist                                       PT OP Goals       Row Name 04/23/25 0500          PT Short Term Goals    STG 1 Patient to demonstrate compliance with initial HEP for flexibility, ROM and strengthening.  -AS     STG 1 Progress Met  -AS     STG 2 Patient to report left shoulder pain on VAS of 4-5/10 at worst with activity.  -AS     STG 2 Progress Ongoing;Progressing  -AS     STG 3 Patient to demonstrate improved left shoulder strength to 4+/5 in all planes.  -AS     STG 3 Progress New;Ongoing;Progressing  -AS     STG 4 Patient to demonstrate improved left shoulder PROM to WFL in all planes.  -AS     STG 4 Progress Partially Met;Ongoing;Progressing  -AS        Long Term Goals    LTG 1 Patient to demonstrate compliance with advanced HEP for flexibility, ROM and strengthening.  -AS     LTG 1 Progress Met  -AS     LTG 2 Patient to report left shoulder pain on VAS of 1-2/10 at worst with activity.  -AS     LTG 2 Progress Ongoing;Progressing  -AS     LTG 3 Patient to demonstrate improved left shoulder strength to 5/5 in all planes.  -AS     LTG 3 Progress Ongoing;Progressing  -AS     LTG 4 Patient to demonstrate improved left shoulder AROM to WFL in all planes.  -AS     LTG 4 Progress Ongoing;Progressing  -AS     LTG 5 Patient to report improved function and decreased pain Quick DASH by >10-15 points.  -AS     LTG 5 Progress Ongoing;Progressing  -AS               User Key  (r) = Recorded By, (t) = Taken By, (c) = Cosigned By      Initials Name Provider Type    AS Kevin Arroyo, PT Physical Therapist                     PT Assessment/Plan       Row Name 04/23/25 0500          PT Assessment    Functional Limitations Limitation in home management;Limitations in community activities;Performance in leisure activities;Performance in sport  "activities;Performance in work activities  -AS     Impairments Impaired flexibility;Muscle strength;Pain;Range of motion  -AS     Assessment Comments Patient continues to show increased PROM and AROM of his left shoulder. Progressed patient with LUE strengthening today and he tolerated this well. Plan to continue to progress patient as tolerated. He does continue to report pain around right clavicle with movements.  -AS     Please refer to paper survey for additional self-reported information Yes  -AS     Rehab Potential Good  -AS     Patient/caregiver participated in establishment of treatment plan and goals Yes  -AS     Patient would benefit from skilled therapy intervention Yes  -AS        PT Plan    PT Frequency 2x/week  -AS     Predicted Duration of Therapy Intervention (PT) 4-6 weeks  -AS     Planned CPT's? PT RE-EVAL: 27826;PT THER PROC EA 15 MIN: 78748;PT THER ACT EA 15 MIN: 72027;PT MANUAL THERAPY EA 15 MIN: 88735;PT NEUROMUSC RE-EDUCATION EA 15 MIN: 83949  -AS     PT Plan Comments Continue with current treatment plan.  -AS               User Key  (r) = Recorded By, (t) = Taken By, (c) = Cosigned By      Initials Name Provider Type    AS Kevin Arroyo, PT Physical Therapist                     Modalities       Row Name 04/23/25 0500             Moist Heat    MH Applied Yes  -AS      Location Left Shoulder - Sitting  -AS      PT Moist Heat Minutes 10  -AS      MH Prior to Rx Yes  -AS         Functional Testing    Outcome Measure Options Quick DASH  -AS                User Key  (r) = Recorded By, (t) = Taken By, (c) = Cosigned By      Initials Name Provider Type    AS Kevin Arroyo, PT Physical Therapist                   OP Exercises       Row Name 04/23/25 0634 04/23/25 0500          Subjective    Subjective Comments -- Patient states he has been having pain around his collar bone. He states \"it feel like it is going to give way.\"  -AS        Subjective Pain    Able to rate subjective pain? " -- yes  -AS     Pre-Treatment Pain Level -- 1  -AS     Post-Treatment Pain Level -- 1  -AS        Total Minutes    00984 - PT Therapeutic Exercise Minutes 25  -AS --     14644 - PT Manual Therapy Minutes 15  -AS --        Exercise 1    Exercise Name 1 -- 3-Way Cane  -AS     Reps 1 -- 15 each  -AS     Time 1 -- 5 sec hold each  -AS        Exercise 2    Exercise Name 2 -- Sleeper Stretch  -AS        Exercise 3    Exercise Name 3 -- Serratus Punches  -AS     Reps 3 -- 25  -AS     Time 3 -- 2#  -AS        Exercise 4    Exercise Name 4 -- S/L ER  -AS     Reps 4 -- 25  -AS     Time 4 -- 2#  -AS        Exercise 5    Exercise Name 5 -- Prone Y, T  -AS        Exercise 6    Exercise Name 6 -- Empty/Full Can  -AS     Reps 6 -- 25 each  -AS     Time 6 -- 2#  -AS        Exercise 7    Exercise Name 7 -- Rows  -AS     Reps 7 -- 40  -AS     Time 7 -- Green  -AS        Exercise 8    Exercise Name 8 -- Extensions  -AS     Reps 8 -- 40  -AS     Time 8 -- Green  -AS        Exercise 9    Exercise Name 9 -- IR  -AS     Reps 9 -- 40  -AS     Time 9 -- Green  -AS        Exercise 10    Exercise Name 10 -- ER  -AS     Reps 10 -- 40  -AS     Time 10 -- Green  -AS        Exercise 11    Exercise Name 11 -- Pulley's - Flex & ABD  -AS     Time 11 -- 4 min each  -AS        Exercise 12    Exercise Name 12 -- Wall Slides - Flex Only  -AS     Reps 12 -- 15  -AS     Time 12 -- 5 sec hold each  -AS               User Key  (r) = Recorded By, (t) = Taken By, (c) = Cosigned By      Initials Name Provider Type    AS Kevin Arroyo, PT Physical Therapist                  Manual Rx (Last 36 Hours)       Manual Treatments       Row Name 04/23/25 0634 04/23/25 0500          Total Minutes    62879 - PT Manual Therapy Minutes 15  -AS --        Manual Rx 1    Manual Rx 1 Location -- Left Shoulder  -AS     Manual Rx 1 Type -- PROM - Flex, ABD, IR, ER  -AS     Manual Rx 1 Duration -- 15 min  -AS               User Key  (r) = Recorded By, (t) = Taken By, (c)  = Cosigned By      Initials Name Provider Type    AS Kevin Arroyo, PT Physical Therapist                                Outcome Measure Options: Quick DASH         Time Calculation:     Start Time: 0541  Stop Time: 0634  Time Calculation (min): 53 min  Timed Charges  74452 - PT Therapeutic Exercise Minutes: 25  69471 - PT Manual Therapy Minutes: 15  Untimed Charges  PT Moist Heat Minutes: 10  Total Minutes  Timed Charges Total Minutes: 40  Untimed Charges Total Minutes: 10   Total Minutes: 50     Therapy Charges for Today       Code Description Service Date Service Provider Modifiers Qty    51218534212 HC PT THER PROC EA 15 MIN 4/23/2025 Kevin Arroyo, PT GP 2    70478351016 HC PT MANUAL THERAPY EA 15 MIN 4/23/2025 Kevin Arroyo, PT GP 1            PT G-Codes  Outcome Measure Options: Tiffany Arroyo, PT  4/23/2025

## 2025-04-28 ENCOUNTER — HOSPITAL ENCOUNTER (OUTPATIENT)
Dept: PHYSICAL THERAPY | Facility: HOSPITAL | Age: 50
Setting detail: THERAPIES SERIES
Discharge: HOME | End: 2025-04-28
Payer: OTHER GOVERNMENT

## 2025-04-28 DIAGNOSIS — Z96.612 STATUS POST TOTAL SHOULDER ARTHROPLASTY, LEFT: Primary | ICD-10-CM

## 2025-04-28 PROCEDURE — 97110 THERAPEUTIC EXERCISES: CPT

## 2025-04-28 PROCEDURE — 97140 MANUAL THERAPY 1/> REGIONS: CPT

## 2025-04-28 NOTE — THERAPY TREATMENT NOTE
Outpatient Physical Therapy Ortho Treatment Note   [facility]     Patient Name: Jose Luis Zelaya  : 1975  MRN: [id number]  Today's Date: 2025      Visit Date: 2025    Visit Dx:    ICD-10-CM ICD-9-CM   1. Status post total shoulder arthroplasty, left  Z96.612 V43.61       There is no problem list on file for this patient.       Past Medical History:   Diagnosis Date    Anxiety     Arthritis     Depression     Diabetes mellitus     History of kidney stones     Hyperlipidemia     Hypertension     PEDRITO (obstructive sleep apnea)     cpap    PTSD (post-traumatic stress disorder)     Shoulder pain     left        Past Surgical History:   Procedure Laterality Date    KNEE ARTHROSCOPY Left     TOTAL SHOULDER ARTHROPLASTY Left 2025    Procedure: LEFT TOTAL SHOULDER ARTHROPLASTY;  Surgeon: Juan Sweet MD;  [place] [facility] [place] [facility] [place]  Service: Orthopedics;  Laterality: Left;    VASECTOMY                          PT Assessment/Plan       Row Name 25 0500          PT Assessment    Assessment Comments Patient continues to do well with PT at this time.  -AS        PT Plan    PT Plan Comments Continue with current treatment plan.  -AS               User Key  (r) = Recorded By, (t) = Taken By, (c) = Cosigned By      Initials Name Provider Type    AS Kevin Arroyo, PT Physical Therapist                     Modalities       Row Name 25 0500             Moist Heat    MH Applied Yes  -AS      Location Left Shoulder - Sitting  -AS      PT Moist Heat Minutes 10  -AS      MH Prior to Rx Yes  -AS         Functional Testing    Outcome Measure Options Quick DASH  -AS                User Key  (r) = Recorded By, (t) = Taken By, (c) = Cosigned By      Initials Name Provider Type    AS Kevin Arroyo, PT Physical Therapist                   OP Exercises       Row Name 25 0649 25 0500          Subjective    Subjective Comments -- Patient states his shoulder is doing ok, but he does  continue to report left shoulder pain.  -AS        Total Minutes    52035 - PT Therapeutic Exercise Minutes 30  -AS --     40896 - PT Manual Therapy Minutes 15  -AS --        Exercise 1    Exercise Name 1 -- 3-Way Cane  -AS     Reps 1 -- 15 each  -AS     Time 1 -- 5 sec hold each  -AS        Exercise 2    Exercise Name 2 -- Sleeper Stretch  -AS        Exercise 3    Exercise Name 3 -- Serratus Punches  -AS     Reps 3 -- 25  -AS     Time 3 -- 2#  -AS        Exercise 4    Exercise Name 4 -- S/L ER  -AS     Reps 4 -- 25  -AS     Time 4 -- 2#  -AS        Exercise 5    Exercise Name 5 -- Prone Y, T  -AS        Exercise 6    Exercise Name 6 -- Empty/Full Can  -AS     Reps 6 -- 25 each  -AS     Time 6 -- 2#  -AS        Exercise 7    Exercise Name 7 -- Rows  -AS     Reps 7 -- 25  -AS     Time 7 -- Blue  -AS        Exercise 8    Exercise Name 8 -- Extensions  -AS     Reps 8 -- 25  -AS     Time 8 -- Blue  -AS        Exercise 9    Exercise Name 9 -- IR  -AS     Reps 9 -- 25  -AS     Time 9 -- Blue  -AS        Exercise 10    Exercise Name 10 -- ER  -AS     Reps 10 -- 25  -AS     Time 10 -- Blue  -AS        Exercise 11    Exercise Name 11 -- Pulley's - Flex & ABD  -AS     Time 11 -- 4 min each  -AS        Exercise 12    Exercise Name 12 -- Wall Slides - Flex Only  -AS     Reps 12 -- 15  -AS     Time 12 -- 5 sec hold each  -AS               User Key  (r) = Recorded By, (t) = Taken By, (c) = Cosigned By      Initials Name Provider Type    AS Kevin Arroyo, PT Physical Therapist                             Manual Rx (Last 36 Hours)       Manual Treatments       Row Name 04/28/25 0649 04/28/25 0500          Total Minutes    65613 - PT Manual Therapy Minutes 15  -AS --        Manual Rx 1    Manual Rx 1 Location -- Left Shoulder  -AS     Manual Rx 1 Type -- PROM - Flex, ABD, IR, ER  -AS     Manual Rx 1 Duration -- 15 min  -AS               User Key  (r) = Recorded By, (t) = Taken By, (c) = Cosigned By      Initials Name  Provider Type    AS Kevin Arroyo, PT Physical Therapist                             Outcome Measure Options: Quick DASH         Time Calculation:   Start Time: 0550  Stop Time: 0649  Time Calculation (min): 59 min  Timed Charges  52363 - PT Therapeutic Exercise Minutes: 30  16338 - PT Manual Therapy Minutes: 15  Untimed Charges  PT Moist Heat Minutes: 10  Total Minutes  Timed Charges Total Minutes: 45  Untimed Charges Total Minutes: 10   Total Minutes: 55  Therapy Charges for Today       Code Description Service Date Service Provider Modifiers Qty    66078092838 HC PT THER PROC EA 15 MIN 4/28/2025 Kevin Arroyo, PT GP 2    43720597118 HC PT MANUAL THERAPY EA 15 MIN 4/28/2025 Kevin Arroyo, PT GP 1            PT G-Codes  Outcome Measure Options: Tiffany Arroyo, PT  4/28/2025

## 2025-04-30 ENCOUNTER — HOSPITAL ENCOUNTER (OUTPATIENT)
Dept: PHYSICAL THERAPY | Facility: HOSPITAL | Age: 50
Setting detail: THERAPIES SERIES
Discharge: HOME | End: 2025-04-30
Payer: OTHER GOVERNMENT

## 2025-04-30 DIAGNOSIS — Z96.612 STATUS POST TOTAL SHOULDER ARTHROPLASTY, LEFT: Primary | ICD-10-CM

## 2025-04-30 PROCEDURE — 97110 THERAPEUTIC EXERCISES: CPT

## 2025-04-30 PROCEDURE — 97140 MANUAL THERAPY 1/> REGIONS: CPT

## 2025-04-30 NOTE — THERAPY TREATMENT NOTE
Outpatient Physical Therapy Ortho Treatment Note   [facility]     Patient Name: Jose Luis Zelaya  : 1975  MRN: [id number]  Today's Date: 2025      Visit Date: 2025    Visit Dx:    ICD-10-CM ICD-9-CM   1. Status post total shoulder arthroplasty, left  Z96.612 V43.61       There is no problem list on file for this patient.       Past Medical History:   Diagnosis Date    Anxiety     Arthritis     Depression     Diabetes mellitus     History of kidney stones     Hyperlipidemia     Hypertension     PEDRITO (obstructive sleep apnea)     cpap    PTSD (post-traumatic stress disorder)     Shoulder pain     left        Past Surgical History:   Procedure Laterality Date    KNEE ARTHROSCOPY Left     TOTAL SHOULDER ARTHROPLASTY Left 2025    Procedure: LEFT TOTAL SHOULDER ARTHROPLASTY;  Surgeon: Juan Sweet MD;  Location: [facility] OR [facility] [place];  Service: Orthopedics;  Laterality: Left;    VASECTOMY                          PT Assessment/Plan       Row Name 25 06          PT Assessment    Assessment Comments Patient continues to do well with PT at this time.  -AS        PT Plan    PT Plan Comments Continue with current treatment plan.  -AS               User Key  (r) = Recorded By, (t) = Taken By, (c) = Cosigned By      Initials Name Provider Type    AS Kevin Arroyo, PT Physical Therapist                     Modalities       Row Name 25 0600             Moist Heat    MH Applied Yes  -AS      Location Left Shoulder - Sitting  -AS      PT Moist Heat Minutes 10  -AS      MH Prior to Rx Yes  -AS         Functional Testing    Outcome Measure Options Quick DASH  -AS                User Key  (r) = Recorded By, (t) = Taken By, (c) = Cosigned By      Initials Name Provider Type    AS Kevin Arroyo, PT Physical Therapist                   OP Exercises       Row Name 25 0648 25 0600          Subjective    Subjective Comments -- Patient states his shoulder is doing ok this morning.   -AS        Total Minutes    93533 - PT Therapeutic Exercise Minutes 20  -AS --     68205 - PT Manual Therapy Minutes 15  -AS --        Exercise 1    Exercise Name 1 -- 3-Way Cane  -AS     Reps 1 -- 15 each  -AS     Time 1 -- 5 sec hold each  -AS        Exercise 2    Exercise Name 2 -- Sleeper Stretch  -AS        Exercise 3    Exercise Name 3 -- Serratus Punches  -AS     Reps 3 -- 30  -AS     Time 3 -- 2#  -AS        Exercise 4    Exercise Name 4 -- S/L ER  -AS     Reps 4 -- 30  -AS     Time 4 -- 2#  -AS        Exercise 5    Exercise Name 5 -- Prone Y, T  -AS        Exercise 6    Exercise Name 6 -- Empty/Full Can  -AS     Reps 6 -- 30 each  -AS     Time 6 -- 2#  -AS        Exercise 7    Exercise Name 7 -- Rows  -AS     Reps 7 -- 30  -AS     Time 7 -- Blue  -AS        Exercise 8    Exercise Name 8 -- Extensions  -AS     Reps 8 -- 30  -AS     Time 8 -- Blue  -AS        Exercise 9    Exercise Name 9 -- IR  -AS     Reps 9 -- 30  -AS     Time 9 -- Blue  -AS        Exercise 10    Exercise Name 10 -- ER  -AS     Reps 10 -- 30  -AS     Time 10 -- Blue  -AS        Exercise 11    Exercise Name 11 -- Pulley's - Flex & ABD  -AS     Time 11 -- 4 min each  -AS        Exercise 12    Exercise Name 12 -- Wall Slides - Flex Only  -AS     Reps 12 -- 15  -AS     Time 12 -- 5 sec hold each  -AS               User Key  (r) = Recorded By, (t) = Taken By, (c) = Cosigned By      Initials Name Provider Type    AS Kevin Arroyo, PT Physical Therapist                             Manual Rx (Last 36 Hours)       Manual Treatments       Row Name 04/30/25 0648 04/30/25 0500          Total Minutes    81572 - PT Manual Therapy Minutes 15  -AS --        Manual Rx 1    Manual Rx 1 Location -- Left Shoulder  -AS     Manual Rx 1 Type -- PROM - Flex, ABD, IR, ER  -AS     Manual Rx 1 Duration -- 15 min  -AS               User Key  (r) = Recorded By, (t) = Taken By, (c) = Cosigned By      Initials Name Provider Type    AS Kevin Arroyo, PT  Physical Therapist                             Outcome Measure Options: Quick DASH         Time Calculation:   Start Time: 0600  Stop Time: 0648  Time Calculation (min): 48 min  Timed Charges  11397 - PT Therapeutic Exercise Minutes: 20  05269 - PT Manual Therapy Minutes: 15  Untimed Charges  PT Moist Heat Minutes: 10  Total Minutes  Timed Charges Total Minutes: 35  Untimed Charges Total Minutes: 10   Total Minutes: 45  Therapy Charges for Today       Code Description Service Date Service Provider Modifiers Qty    83792102585 HC PT THER PROC EA 15 MIN 4/30/2025 Kevin Arroyo, PT GP 1    62867470079 HC PT MANUAL THERAPY EA 15 MIN 4/30/2025 Kevin Arroyo, PT GP 1            PT G-Codes  Outcome Measure Options: Tiffany Arroyo, PT  4/30/2025

## 2025-05-05 ENCOUNTER — HOSPITAL ENCOUNTER (OUTPATIENT)
Dept: PHYSICAL THERAPY | Facility: HOSPITAL | Age: 50
Setting detail: THERAPIES SERIES
Discharge: HOME | End: 2025-05-05
Payer: OTHER GOVERNMENT

## 2025-05-05 DIAGNOSIS — Z96.612 STATUS POST TOTAL SHOULDER ARTHROPLASTY, LEFT: Primary | ICD-10-CM

## 2025-05-05 PROCEDURE — 97140 MANUAL THERAPY 1/> REGIONS: CPT

## 2025-05-05 PROCEDURE — 97110 THERAPEUTIC EXERCISES: CPT

## 2025-05-05 NOTE — THERAPY TREATMENT NOTE
Outpatient Physical Therapy Ortho Treatment Note   [facility]     Patient Name: Jose Luis Zelaya  : 1975  MRN: [id number]  Today's Date: 2025      Visit Date: 2025    Visit Dx:    ICD-10-CM ICD-9-CM   1. Status post total shoulder arthroplasty, left  Z96.612 V43.61       There is no problem list on file for this patient.       Past Medical History:   Diagnosis Date    Anxiety     Arthritis     Depression     Diabetes mellitus     History of kidney stones     Hyperlipidemia     Hypertension     PEDRITO (obstructive sleep apnea)     cpap    PTSD (post-traumatic stress disorder)     Shoulder pain     left        Past Surgical History:   Procedure Laterality Date    KNEE ARTHROSCOPY Left     TOTAL SHOULDER ARTHROPLASTY Left 2025    Procedure: LEFT TOTAL SHOULDER ARTHROPLASTY;  Surgeon: Juan Sweet MD;  Location: [facility] OR [place];  Service: Orthopedics;  Laterality: Left;    VASECTOMY                          PT Assessment/Plan       Row Name 25 0500          PT Assessment    Assessment Comments Patient continues to do well with PT at this time. Plan to continue with PT as tolerated.  -AS        PT Plan    PT Plan Comments Continue with current treatment plan.  -AS               User Key  (r) = Recorded By, (t) = Taken By, (c) = Cosigned By      Initials Name Provider Type    AS Kevin Arroyo, PT Physical Therapist                     Modalities       Row Name 25 0500             Moist Heat    MH Applied Yes  -AS      Location Left Shoulder - Sitting  -AS      PT Moist Heat Minutes 10  -AS      MH Prior to Rx Yes  -AS         Functional Testing    Outcome Measure Options Quick DASH  -AS                User Key  (r) = Recorded By, (t) = Taken By, (c) = Cosigned By      Initials Name Provider Type    AS Kevin Arroyo, PT Physical Therapist                   OP Exercises       Row Name 25 0619 25 0500          Subjective    Subjective Comments -- Patient states  his shoulder is doing well.  -AS        Total Minutes    91278 - PT Therapeutic Exercise Minutes 20  -AS --     78328 - PT Manual Therapy Minutes 15  -AS --        Exercise 1    Exercise Name 1 -- 3-Way Cane  -AS     Reps 1 -- 15 each  -AS     Time 1 -- 5 sec hold each  -AS        Exercise 2    Exercise Name 2 -- Sleeper Stretch  -AS        Exercise 3    Exercise Name 3 -- Serratus Punches  -AS     Reps 3 -- 40  -AS     Time 3 -- 2#  -AS        Exercise 4    Exercise Name 4 -- S/L ER  -AS     Reps 4 -- 40  -AS     Time 4 -- 2#  -AS        Exercise 5    Exercise Name 5 -- Prone Y, T  -AS        Exercise 6    Exercise Name 6 -- Empty/Full Can  -AS     Reps 6 -- 40 each  -AS     Time 6 -- 2#  -AS        Exercise 7    Exercise Name 7 -- Rows  -AS     Reps 7 -- 40  -AS     Time 7 -- Blue  -AS        Exercise 8    Exercise Name 8 -- Extensions  -AS     Reps 8 -- 40  -AS     Time 8 -- Blue  -AS        Exercise 9    Exercise Name 9 -- IR  -AS     Reps 9 -- 40  -AS     Time 9 -- Blue  -AS        Exercise 10    Exercise Name 10 -- ER  -AS     Reps 10 -- 40  -AS     Time 10 -- Blue  -AS        Exercise 11    Exercise Name 11 -- Pulley's - Flex & ABD  -AS     Time 11 -- 4 min each  -AS        Exercise 12    Exercise Name 12 -- Wall Slides - Flex Only  -AS     Reps 12 -- 15  -AS     Time 12 -- 5 sec hold each  -AS               User Key  (r) = Recorded By, (t) = Taken By, (c) = Cosigned By      Initials Name Provider Type    AS Kevin Arroyo, PT Physical Therapist                             Manual Rx (Last 36 Hours)       Manual Treatments       Row Name 05/05/25 0619 05/05/25 0500          Total Minutes    41877 - PT Manual Therapy Minutes 15  -AS --        Manual Rx 1    Manual Rx 1 Location -- Left Shoulder  -AS     Manual Rx 1 Type -- PROM - Flex, ABD, IR, ER  -AS     Manual Rx 1 Duration -- 15 min  -AS               User Key  (r) = Recorded By, (t) = Taken By, (c) = Cosigned By      Initials Name Provider Type     AS Kevin Arroyo, PT Physical Therapist                             Outcome Measure Options: Quick DASH         Time Calculation:   Start Time: 0526  Stop Time: 0612  Time Calculation (min): 46 min  Timed Charges  54015 - PT Therapeutic Exercise Minutes: 20  93056 - PT Manual Therapy Minutes: 15  Untimed Charges  PT Moist Heat Minutes: 10  Total Minutes  Timed Charges Total Minutes: 35  Untimed Charges Total Minutes: 10   Total Minutes: 45  Therapy Charges for Today       Code Description Service Date Service Provider Modifiers Qty    75911351668 HC PT THER PROC EA 15 MIN 5/5/2025 Kevin Arroyo, PT GP 1    [id number] HC PT MANUAL THERAPY EA 15 MIN 5/5/2025 Kevin Arroyo, PT GP 1            PT G-Codes  Outcome Measure Options: Tiffany Arroyo, PT  5/5/2025

## 2025-05-07 ENCOUNTER — HOSPITAL ENCOUNTER (OUTPATIENT)
Dept: PHYSICAL THERAPY | Facility: HOSPITAL | Age: 50
Setting detail: THERAPIES SERIES
Discharge: HOME | End: 2025-05-07
Payer: OTHER GOVERNMENT

## 2025-05-07 DIAGNOSIS — Z96.612 STATUS POST TOTAL SHOULDER ARTHROPLASTY, LEFT: Primary | ICD-10-CM

## 2025-05-07 PROCEDURE — 97140 MANUAL THERAPY 1/> REGIONS: CPT

## 2025-05-07 PROCEDURE — 97110 THERAPEUTIC EXERCISES: CPT

## 2025-05-07 NOTE — THERAPY TREATMENT NOTE
Outpatient Physical Therapy Ortho Treatment Note   [facility]     Patient Name: Jose Luis Zelaya  : 1975  MRN: [id number]  Today's Date: 2025      Visit Date: 2025    Visit Dx:    ICD-10-CM ICD-9-CM   1. Status post total shoulder arthroplasty, left  Z96.612 V43.61       There is no problem list on file for this patient.       Past Medical History:   Diagnosis Date    Anxiety     Arthritis     Depression     Diabetes mellitus     History of kidney stones     Hyperlipidemia     Hypertension     PEDRITO (obstructive sleep apnea)     cpap    PTSD (post-traumatic stress disorder)     Shoulder pain     left        Past Surgical History:   Procedure Laterality Date    KNEE ARTHROSCOPY Left     TOTAL SHOULDER ARTHROPLASTY Left 2025    Procedure: LEFT TOTAL SHOULDER ARTHROPLASTY;  Surgeon: Juan Sweet MD;  Location: [facility] [place];  Service: Orthopedics;  Laterality: Left;    VASECTOMY                          PT Assessment/Plan       Row Name 25 0500          PT Assessment    Assessment Comments Continued to progress patient with strengthening exercises today and he continues to tolerate progressiosn well. Patient's left shoulder ROM and strength continue to improve. Plan to continue to progress patient as tolerated.  -AS        PT Plan    PT Plan Comments Continue with current treatment plan.  -AS               User Key  (r) = Recorded By, (t) = Taken By, (c) = Cosigned By      Initials Name Provider Type    AS Kevin Arroyo, PT Physical Therapist                     Modalities       Row Name 25 0500             Moist Heat    MH Applied Yes  -AS      Location Left Shoulder - Sitting  -AS      PT Moist Heat Minutes 10  -AS      MH Prior to Rx Yes  -AS         Functional Testing    Outcome Measure Options Quick DASH  -AS                User Key  (r) = Recorded By, (t) = Taken By, (c) = Cosigned By      Initials Name Provider Type    AS Kevin Arroyo, PT Physical Therapist                    OP Exercises       Row Name 05/07/25 0644 05/07/25 0500          Subjective    Subjective Comments -- Patient states his shoulder is doing well for the most part. He is reporting pain and tenderness to top of his shoulder. He states he is scheduled to see his Ortho next week.  -AS        Total Minutes    27783 - PT Therapeutic Exercise Minutes 20  -AS --     04555 - PT Manual Therapy Minutes 15  -AS --        Exercise 1    Exercise Name 1 -- 3-Way Cane  -AS     Reps 1 -- 15 each  -AS     Time 1 -- 5 sec hold each  -AS        Exercise 2    Exercise Name 2 -- Sleeper Stretch  -AS        Exercise 3    Exercise Name 3 -- Serratus Punches  -AS     Reps 3 -- 25  -AS     Time 3 -- 3#  -AS        Exercise 4    Exercise Name 4 -- S/L ER  -AS     Reps 4 -- 25  -AS     Time 4 -- 3#  -AS        Exercise 5    Exercise Name 5 -- Prone Y, T  -AS        Exercise 6    Exercise Name 6 -- Empty/Full Can  -AS     Reps 6 -- 25 each  -AS     Time 6 -- 3#  -AS        Exercise 7    Exercise Name 7 -- Rows  -AS     Reps 7 -- 25  -AS     Time 7 -- Black  -AS        Exercise 8    Exercise Name 8 -- Extensions  -AS     Reps 8 -- 25  -AS     Time 8 -- Black  -AS        Exercise 9    Exercise Name 9 -- IR  -AS     Reps 9 -- 25  -AS     Time 9 -- Black  -AS        Exercise 10    Exercise Name 10 -- ER  -AS     Reps 10 -- 25  -AS     Time 10 -- Black  -AS        Exercise 11    Exercise Name 11 -- Pulley's - Flex & ABD  -AS     Time 11 -- 4 min each  -AS        Exercise 12    Exercise Name 12 -- Wall Slides - Flex Only  -AS     Reps 12 -- 15  -AS     Time 12 -- 5 sec hold each  -AS               User Key  (r) = Recorded By, (t) = Taken By, (c) = Cosigned By      Initials Name Provider Type    AS Kevin Arroyo, PT Physical Therapist                             Manual Rx (Last 36 Hours)       Manual Treatments       Row Name 05/07/25 0644 05/07/25 0500          Total Minutes    93946 - PT Manual Therapy Minutes 15  -AS --         Manual Rx 1    Manual Rx 1 Location -- Left Shoulder  -AS     Manual Rx 1 Type -- PROM - Flex, ABD, IR, ER  -AS     Manual Rx 1 Duration -- 15 min  -AS               User Key  (r) = Recorded By, (t) = Taken By, (c) = Cosigned By      Initials Name Provider Type    AS Kevin Arroyo, PT Physical Therapist                             Outcome Measure Options: Quick DASH         Time Calculation:   Start Time: 0554  Stop Time: 0643  Time Calculation (min): 49 min  Timed Charges  94576 - PT Therapeutic Exercise Minutes: 20  72014 - PT Manual Therapy Minutes: 15  Untimed Charges  PT Moist Heat Minutes: 10  Total Minutes  Timed Charges Total Minutes: 35  Untimed Charges Total Minutes: 10   Total Minutes: 45  Therapy Charges for Today       Code Description Service Date Service Provider Modifiers Qty    32948429351 HC PT THER PROC EA 15 MIN 5/7/2025 Kevin Arroyo, PT GP 1    [id number] HC PT MANUAL THERAPY EA 15 MIN 5/7/2025 Kevin Arroyo, PT GP 1            PT G-Codes  Outcome Measure Options: Quick DASH         Kevin Arroyo, PT  5/7/2025

## 2025-05-12 ENCOUNTER — HOSPITAL ENCOUNTER (OUTPATIENT)
Dept: PHYSICAL THERAPY | Facility: HOSPITAL | Age: 50
Setting detail: THERAPIES SERIES
Discharge: HOME | End: 2025-05-12
Payer: OTHER GOVERNMENT

## 2025-05-12 DIAGNOSIS — Z96.612 STATUS POST TOTAL SHOULDER ARTHROPLASTY, LEFT: Primary | ICD-10-CM

## 2025-05-12 PROCEDURE — 97140 MANUAL THERAPY 1/> REGIONS: CPT

## 2025-05-12 PROCEDURE — 97110 THERAPEUTIC EXERCISES: CPT

## 2025-05-12 NOTE — THERAPY TREATMENT NOTE
Outpatient Physical Therapy Ortho Treatment Note   [facility]     Patient Name: Jose Luis Zelaya  : 1975  MRN: [id number]  Today's Date: 2025      Visit Date: 2025    Visit Dx:    ICD-10-CM ICD-9-CM   1. Status post total shoulder arthroplasty, left  Z96.612 V43.61       There is no problem list on file for this patient.       Past Medical History:   Diagnosis Date    Anxiety     Arthritis     Depression     Diabetes mellitus     History of kidney stones     Hyperlipidemia     Hypertension     PEDRITO (obstructive sleep apnea)     cpap    PTSD (post-traumatic stress disorder)     Shoulder pain     left        Past Surgical History:   Procedure Laterality Date    KNEE ARTHROSCOPY Left     TOTAL SHOULDER ARTHROPLASTY Left 2025    Procedure: LEFT TOTAL SHOULDER ARTHROPLASTY;  Surgeon: Juan Sweet MD;  Location: [facility] OR [facility] [place];  Service: Orthopedics;  Laterality: Left;    VASECTOMY                          PT Assessment/Plan       Row Name 25 0500          PT Assessment    Assessment Comments Continued to progress patient with strengthening exercises today and he continues to tolerate progressiosn well. Patient's left shoulder ROM and strength continue to improve. Plan to continue to progress patient as tolerated.  -AS        PT Plan    PT Plan Comments Continue with current treatment plan.  -AS               User Key  (r) = Recorded By, (t) = Taken By, (c) = Cosigned By      Initials Name Provider Type    AS Kevin Arroyo, PT Physical Therapist                     Modalities       Row Name 25 0500             Moist Heat    MH Applied Yes  -AS      Location Left Shoulder - Sitting  -AS      PT Moist Heat Minutes 10  -AS      MH Prior to Rx Yes  -AS         Functional Testing    Outcome Measure Options Quick DASH  -AS                User Key  (r) = Recorded By, (t) = Taken By, (c) = Cosigned By      Initials Name Provider Type    AS Kevin Arroyo, PT Physical Therapist                    OP Exercises       Row Name 05/12/25 0603 05/12/25 0500          Subjective    Subjective Comments -- Patient states his shoulder is feeling better. He states he is scheduled to see his Ortho this week.  -AS        Total Minutes    84646 - PT Therapeutic Exercise Minutes 20  -AS --     39641 - PT Manual Therapy Minutes 15  -AS --        Exercise 1    Exercise Name 1 -- 3-Way Cane  -AS     Reps 1 -- 15 each  -AS     Time 1 -- 5 sec hold each  -AS        Exercise 2    Exercise Name 2 -- Sleeper Stretch  -AS        Exercise 3    Exercise Name 3 -- Serratus Punches  -AS     Reps 3 -- 25  -AS     Time 3 -- 3#  -AS        Exercise 4    Exercise Name 4 -- S/L ER  -AS     Reps 4 -- 25  -AS     Time 4 -- 3#  -AS        Exercise 5    Exercise Name 5 -- Prone Y, T  -AS        Exercise 6    Exercise Name 6 -- Empty/Full Can  -AS     Reps 6 -- 25 each  -AS     Time 6 -- 3#  -AS        Exercise 7    Exercise Name 7 -- Rows  -AS     Reps 7 -- 25  -AS     Time 7 -- Black  -AS        Exercise 8    Exercise Name 8 -- Extensions  -AS     Reps 8 -- 25  -AS     Time 8 -- Black  -AS        Exercise 9    Exercise Name 9 -- IR  -AS     Reps 9 -- 25  -AS     Time 9 -- Black  -AS        Exercise 10    Exercise Name 10 -- ER  -AS     Reps 10 -- 25  -AS     Time 10 -- Black  -AS        Exercise 11    Exercise Name 11 -- Pulley's - Flex & ABD  -AS     Time 11 -- 4 min each  -AS        Exercise 12    Exercise Name 12 -- Wall Slides - Flex Only  -AS     Reps 12 -- 15  -AS     Time 12 -- 5 sec hold each  -AS               User Key  (r) = Recorded By, (t) = Taken By, (c) = Cosigned By      Initials Name Provider Type    AS Kevin Arroyo, PT Physical Therapist                             Manual Rx (Last 36 Hours)       Manual Treatments       Row Name 05/12/25 0603 05/12/25 0500          Total Minutes    59922 - PT Manual Therapy Minutes 15  -AS --        Manual Rx 1    Manual Rx 1 Location -- Left Shoulder  -AS     Manual  Rx 1 Type -- PROM - Flex, ABD, IR, ER  -AS     Manual Rx 1 Duration -- 15 min  -AS               User Key  (r) = Recorded By, (t) = Taken By, (c) = Cosigned By      Initials Name Provider Type    AS Kevin Arroyo, PT Physical Therapist                             Outcome Measure Options: Quick DASH         Time Calculation:   Start Time: 0517  Stop Time: 0603  Time Calculation (min): 46 min  Timed Charges  58875 - PT Therapeutic Exercise Minutes: 20  57136 - PT Manual Therapy Minutes: 15  Untimed Charges  PT Moist Heat Minutes: 10  Total Minutes  Timed Charges Total Minutes: 35  Untimed Charges Total Minutes: 10   Total Minutes: 45  Therapy Charges for Today       Code Description Service Date Service Provider Modifiers Qty    33910593737 HC PT THER PROC EA 15 MIN 5/12/2025 Kevin Arroyo, PT GP 1    [id number] HC PT MANUAL THERAPY EA 15 MIN 5/12/2025 Kevin Arroyo, PT GP 1            PT G-Codes  Outcome Measure Options: Tiffany Arroyo, PT  5/12/2025

## 2025-05-14 ENCOUNTER — HOSPITAL ENCOUNTER (OUTPATIENT)
Dept: PHYSICAL THERAPY | Facility: HOSPITAL | Age: 50
Setting detail: THERAPIES SERIES
Discharge: HOME | End: 2025-05-14
Payer: OTHER GOVERNMENT

## 2025-05-14 DIAGNOSIS — Z96.612 STATUS POST TOTAL SHOULDER ARTHROPLASTY, LEFT: Primary | ICD-10-CM

## 2025-05-14 PROCEDURE — 97110 THERAPEUTIC EXERCISES: CPT

## 2025-05-14 NOTE — THERAPY RE-EVALUATION
Outpatient Physical Therapy Ortho Re-Evaluation   [facility]     Patient Name: Jose Luis Zelaya  : 1975  MRN: [id number]  Today's Date: 2025      Visit Date: 2025    There is no problem list on file for this patient.       Past Medical History:   Diagnosis Date    Anxiety     Arthritis     Depression     Diabetes mellitus     History of kidney stones     Hyperlipidemia     Hypertension     PEDRITO (obstructive sleep apnea)     cpap    PTSD (post-traumatic stress disorder)     Shoulder pain     left        Past Surgical History:   Procedure Laterality Date    KNEE ARTHROSCOPY Left     TOTAL SHOULDER ARTHROPLASTY Left 2025    Procedure: LEFT TOTAL SHOULDER ARTHROPLASTY;  Surgeon: Juan Sweet MD;  Location: [facility] OR [facility] – [place];  Service: Orthopedics;  Laterality: Left;    VASECTOMY         Visit Dx:     ICD-10-CM ICD-9-CM   1. Status post total shoulder arthroplasty, left  Z96.612 V43.61              PT Ortho       Row Name 25 0500       Precautions and Contraindications    Precautions/Limitations shoulder precautions  -AS       Subjective Pain    Able to rate subjective pain? yes  -AS    Pre-Treatment Pain Level 0  -AS    Post-Treatment Pain Level 0  -AS       Posture/Observations    Posture- WNL Posture is WNL  -AS    Observations Incision healing  -AS       Left Upper Ext    Lt Shoulder Abduction AROM 168  -AS    Lt Shoulder Abduction PROM WFL  -AS    Lt Shoulder Flexion AROM 163  -AS    Lt Shoulder Flexion PROM WFL  -AS    Lt Shoulder External Rotation PROM WFL  -AS    Lt Shoulder Internal Rotation PROM WFL  -AS       MMT Left Upper Ext    Lt Shoulder Flexion MMT, Gross Movement (4/5) good  -AS    Lt Shoulder ABduction MMT, Gross Movement (4/5) good  -AS    Lt Shoulder Internal Rotation MMT, Gross Movement (4+/5) good plus  -AS    Lt Shoulder External Rotation MMT, Gross Movement (4/5) good  -AS       Sensation    Sensation WNL? WNL  -AS    Light Touch No apparent deficits  -AS               User Key  (r) = Recorded By, (t) = Taken By, (c) = Cosigned By      Initials Name Provider Type    AS Kevin Arroyo, PT Physical Therapist                                       PT OP Goals       Row Name 05/14/25 0500          PT Short Term Goals    STG 1 Patient to demonstrate compliance with initial HEP for flexibility, ROM and strengthening.  -AS     STG 1 Progress Met  -AS     STG 2 Patient to report left shoulder pain on VAS of 4-5/10 at worst with activity.  -AS     STG 2 Progress Met  -AS     STG 3 Patient to demonstrate improved left shoulder strength to 4+/5 in all planes.  -AS     STG 3 Progress Partially Met;Ongoing;Progressing  -AS     STG 4 Patient to demonstrate improved left shoulder PROM to WFL in all planes.  -AS     STG 4 Progress Met  -AS        Long Term Goals    LTG 1 Patient to demonstrate compliance with advanced HEP for flexibility, ROM and strengthening.  -AS     LTG 1 Progress Met  -AS     LTG 2 Patient to report left shoulder pain on VAS of 1-2/10 at worst with activity.  -AS     LTG 2 Progress Ongoing;Progressing  -AS     LTG 3 Patient to demonstrate improved left shoulder strength to 5/5 in all planes.  -AS     LTG 3 Progress Ongoing;Progressing  -AS     LTG 4 Patient to demonstrate improved left shoulder AROM to WFL in all planes.  -AS     LTG 4 Progress Partially Met;Ongoing;Progressing  -AS     LTG 5 Patient to report improved function and decreased pain Quick DASH by >10-15 points.  -AS     LTG 5 Progress Met  -AS               User Key  (r) = Recorded By, (t) = Taken By, (c) = Cosigned By      Initials Name Provider Type    AS Kevin Arroyo, PT Physical Therapist                     PT Assessment/Plan       Row Name 05/14/25 0500          PT Assessment    Functional Limitations Limitations in community activities;Performance in work activities;Performance in sport activities;Limitation in home management  -AS     Impairments Muscle strength;Pain;Range of motion   -AS     Assessment Comments Overall patient is doing well and has good left UE strength and good left shoulder ROM. However, he continues to report a pain and discomfort in certain movements. Patient is scheduled to see his Ortho this morning. Will continue with outpatient PT as advised.  -AS     Please refer to paper survey for additional self-reported information Yes  -AS     Rehab Potential Good  -AS     Patient/caregiver participated in establishment of treatment plan and goals Yes  -AS     Patient would benefit from skilled therapy intervention Yes  -AS        PT Plan    PT Frequency 1x/week;2x/week  -AS     Predicted Duration of Therapy Intervention (PT) 4-6 weeks  -AS     Planned CPT's? PT RE-EVAL: 78464;PT THER PROC EA 15 MIN: 09034;PT THER ACT EA 15 MIN: 25654;PT MANUAL THERAPY EA 15 MIN: 27688;PT NEUROMUSC RE-EDUCATION EA 15 MIN: 14997  -AS     PT Plan Comments Patient to see his Ortho this morning. Will continue with outpatient PT as advised.  -AS               User Key  (r) = Recorded By, (t) = Taken By, (c) = Cosigned By      Initials Name Provider Type    AS Kevin Arroyo, PT Physical Therapist                     Modalities       Row Name 05/14/25 0500             Moist Heat    MH Applied Yes  -AS      Location Left Shoulder - Sitting  -AS      PT Moist Heat Minutes 10  -AS      MH Prior to Rx Yes  -AS         Functional Testing    Outcome Measure Options Quick DASH  -AS                User Key  (r) = Recorded By, (t) = Taken By, (c) = Cosigned By      Initials Name Provider Type    AS Kevin Arroyo, PT Physical Therapist                   OP Exercises       Row Name 05/14/25 0640 05/14/25 0500          Subjective    Subjective Comments -- Patient states his shoulder is doing well. He states he is scheduled to see his Ortho this morning. He reports he feels stronger and has more ROM, however, continues to have a pain and discomfort in certain movements.  -AS        Subjective Pain     Able to rate subjective pain? -- yes  -AS     Pre-Treatment Pain Level -- 0  -AS     Post-Treatment Pain Level -- 0  -AS        Total Minutes    68420 - PT Therapeutic Exercise Minutes 30  -AS --        Exercise 1    Exercise Name 1 -- 3-Way Cane  -AS     Reps 1 -- 15 each  -AS     Time 1 -- 5 sec hold each  -AS        Exercise 2    Exercise Name 2 -- Sleeper Stretch  -AS        Exercise 3    Exercise Name 3 -- Serratus Punches  -AS     Reps 3 -- 25  -AS     Time 3 -- 3#  -AS        Exercise 4    Exercise Name 4 -- S/L ER  -AS     Reps 4 -- 25  -AS     Time 4 -- 3#  -AS        Exercise 5    Exercise Name 5 -- Prone Y, T  -AS        Exercise 6    Exercise Name 6 -- Empty/Full Can  -AS     Reps 6 -- 25 each  -AS     Time 6 -- 3#  -AS        Exercise 7    Exercise Name 7 -- Rows  -AS     Reps 7 -- 25  -AS     Time 7 -- Black  -AS        Exercise 8    Exercise Name 8 -- Extensions  -AS     Reps 8 -- 25  -AS     Time 8 -- Black  -AS        Exercise 9    Exercise Name 9 -- IR  -AS     Reps 9 -- 25  -AS     Time 9 -- Black  -AS        Exercise 10    Exercise Name 10 -- ER  -AS     Reps 10 -- 25  -AS     Time 10 -- Black  -AS        Exercise 11    Exercise Name 11 -- Pulley's - Flex & ABD  -AS     Time 11 -- 4 min each  -AS        Exercise 12    Exercise Name 12 -- Wall Slides - Flex Only  -AS     Reps 12 -- 15  -AS     Time 12 -- 5 sec hold each  -AS               User Key  (r) = Recorded By, (t) = Taken By, (c) = Cosigned By      Initials Name Provider Type    AS Kevin Arroyo, PT Physical Therapist                  Manual Rx (Last 36 Hours)       Manual Treatments       Row Name 05/14/25 0500             Manual Rx 1    Manual Rx 1 Location --  -AS      Manual Rx 1 Type --  -AS      Manual Rx 1 Duration --  -AS                User Key  (r) = Recorded By, (t) = Taken By, (c) = Cosigned By      Initials Name Provider Type    AS Kevin Arroyo, PT Physical Therapist                                Outcome  Measure Options: Quick DASH         Time Calculation:     Start Time: 0559  Stop Time: 0641  Time Calculation (min): 42 min  Timed Charges  75528 - PT Therapeutic Exercise Minutes: 30  Untimed Charges  PT Moist Heat Minutes: 10  Total Minutes  Timed Charges Total Minutes: 30  Untimed Charges Total Minutes: 10   Total Minutes: 40     Therapy Charges for Today       Code Description Service Date Service Provider Modifiers Qty    87912191695 HC PT THER PROC EA 15 MIN 5/14/2025 Kevin Arroyo, PT GP 2            PT G-Codes  Outcome Measure Options: Tiffany Arroyo, PT  5/14/2025

## 2025-05-19 ENCOUNTER — HOSPITAL ENCOUNTER (OUTPATIENT)
Dept: PHYSICAL THERAPY | Facility: HOSPITAL | Age: 50
Setting detail: THERAPIES SERIES
Discharge: HOME | End: 2025-05-19
Payer: OTHER GOVERNMENT

## 2025-05-19 DIAGNOSIS — Z96.612 STATUS POST TOTAL SHOULDER ARTHROPLASTY, LEFT: Primary | ICD-10-CM

## 2025-05-19 NOTE — THERAPY TREATMENT NOTE
Outpatient Physical Therapy Ortho Treatment Note   [facility]     Patient Name: Jose Luis Zelaya  : 1975  MRN: [id number]  Today's Date: 2025      Visit Date: 2025    Visit Dx:    ICD-10-CM ICD-9-CM   1. Status post total shoulder arthroplasty, left  Z96.612 V43.61       There is no problem list on file for this patient.       Past Medical History:   Diagnosis Date    Anxiety     Arthritis     Depression     Diabetes mellitus     History of kidney stones     Hyperlipidemia     Hypertension     PEDRITO (obstructive sleep apnea)     cpap    PTSD (post-traumatic stress disorder)     Shoulder pain     left        Past Surgical History:   Procedure Laterality Date    KNEE ARTHROSCOPY Left     TOTAL SHOULDER ARTHROPLASTY Left 2025    Procedure: LEFT TOTAL SHOULDER ARTHROPLASTY;  Surgeon: Juan Sweet MD;  Location: [facility] OR [facility] [place];  Service: Orthopedics;  Laterality: Left;    VASECTOMY                          PT Assessment/Plan       Row Name 25 0500          PT Assessment    Assessment Comments Patient continues to do well with PT at this time. Plan to decrease his frequency to 1x per week and then wean patient from PT onto a HEP only.  -AS        PT Plan    PT Plan Comments Continue with current treatment plan.  -AS               User Key  (r) = Recorded By, (t) = Taken By, (c) = Cosigned By      Initials Name Provider Type    AS Kevin Arroyo, PT Physical Therapist                     Modalities       Row Name 25 0500             Moist Heat    MH Applied Yes  -AS      Location Left Shoulder - Sitting  -AS      PT Moist Heat Minutes 10  -AS      MH Prior to Rx Yes  -AS         Functional Testing    Outcome Measure Options Quick DASH  -AS                User Key  (r) = Recorded By, (t) = Taken By, (c) = Cosigned By      Initials Name Provider Type    AS Kevin Arroyo, PT Physical Therapist                   OP Exercises       Row Name 25 0503              Subjective    Subjective Comments Patient states his Orthi is pleased with his progress at this time.  -AS         Exercise 1    Exercise Name 1 3-Way Cane  -AS      Reps 1 15 each  -AS      Time 1 5 sec hold each  -AS         Exercise 2    Exercise Name 2 Sleeper Stretch  -AS         Exercise 3    Exercise Name 3 Serratus Punches  -AS      Reps 3 25  -AS      Time 3 5#  -AS         Exercise 4    Exercise Name 4 S/L ER  -AS      Reps 4 25  -AS      Time 4 5#  -AS         Exercise 5    Exercise Name 5 Prone Y, T  -AS         Exercise 6    Exercise Name 6 Empty/Full Can  -AS      Reps 6 25 each  -AS      Time 6 5#  -AS         Exercise 7    Exercise Name 7 Rows  -AS      Reps 7 25  -AS      Time 7 Gold  -AS         Exercise 8    Exercise Name 8 Extensions  -AS      Reps 8 25  -AS      Time 8 Gold  -AS         Exercise 9    Exercise Name 9 IR  -AS      Reps 9 25  -AS      Time 9 Gold  -AS         Exercise 10    Exercise Name 10 ER  -AS      Reps 10 25  -AS      Time 10 Gold  -AS         Exercise 11    Exercise Name 11 Pulley's - Flex & ABD  -AS      Time 11 3 min each  -AS         Exercise 12    Exercise Name 12 Wall Slides - Flex Only  -AS      Reps 12 15  -AS      Time 12 5 sec hold each  -AS                User Key  (r) = Recorded By, (t) = Taken By, (c) = Cosigned By      Initials Name Provider Type    AS Kevin Arroyo, PT Physical Therapist                             Manual Rx (Last 36 Hours)       Manual Treatments       Row Name 05/19/25 0500             Manual Rx 1    Manual Rx 1 Location Left Shoulder  -AS      Manual Rx 1 Type PROM - Flex, ABD, IR, ER  -AS      Manual Rx 1 Duration 10 min  -AS                User Key  (r) = Recorded By, (t) = Taken By, (c) = Cosigned By      Initials Name Provider Type    AS Kevin Arroyo, PT Physical Therapist                             Outcome Measure Options: Quick DASH         Time Calculation:   Untimed Charges  PT Moist Heat Minutes: 10  Total  Minutes  Untimed Charges Total Minutes: 10   Total Minutes: 10      PT G-Codes  Outcome Measure Options: Tiffany Arroyo, PT  5/19/2025

## 2025-05-21 ENCOUNTER — APPOINTMENT (OUTPATIENT)
Dept: PHYSICAL THERAPY | Facility: HOSPITAL | Age: 50
End: 2025-05-21
Payer: OTHER GOVERNMENT

## 2025-05-29 ENCOUNTER — APPOINTMENT (OUTPATIENT)
Dept: PHYSICAL THERAPY | Facility: HOSPITAL | Age: 50
End: 2025-05-29
Payer: OTHER GOVERNMENT

## 2025-06-02 ENCOUNTER — HOSPITAL ENCOUNTER (OUTPATIENT)
Dept: PHYSICAL THERAPY | Facility: HOSPITAL | Age: 50
Setting detail: THERAPIES SERIES
Discharge: HOME | End: 2025-06-02
Payer: OTHER GOVERNMENT

## 2025-06-02 DIAGNOSIS — Z96.612 STATUS POST TOTAL SHOULDER ARTHROPLASTY, LEFT: Primary | ICD-10-CM

## 2025-06-02 PROCEDURE — 97110 THERAPEUTIC EXERCISES: CPT

## 2025-06-02 NOTE — THERAPY TREATMENT NOTE
Outpatient Physical Therapy Ortho Treatment Note   [facility]     Patient Name: Jose Luis Zelaya  : 1975  MRN: [id number]  Today's Date: 2025      Visit Date: 2025    Visit Dx:    ICD-10-CM ICD-9-CM   1. Status post total shoulder arthroplasty, left  Z96.612 V43.61       There is no problem list on file for this patient.       Past Medical History:   Diagnosis Date    Anxiety     Arthritis     Depression     Diabetes mellitus     History of kidney stones     Hyperlipidemia     Hypertension     PEDRITO (obstructive sleep apnea)     cpap    PTSD (post-traumatic stress disorder)     Shoulder pain     left        Past Surgical History:   Procedure Laterality Date    KNEE ARTHROSCOPY Left     TOTAL SHOULDER ARTHROPLASTY Left 2025    Procedure: LEFT TOTAL SHOULDER ARTHROPLASTY;  Surgeon: Juan Sweet MD;  Location: [facility] OR [facility] [place];  Service: Orthopedics;  Laterality: Left;    VASECTOMY                          PT Assessment/Plan       Row Name 25 0500          PT Assessment    Assessment Comments Patient continues to do well with PT at this time. Patient and PT feel he can be D/C patient onto a HEP only at this time.  -AS        PT Plan    PT Plan Comments Plan to D/C patient onto a HEP only at this time.  -AS               User Key  (r) = Recorded By, (t) = Taken By, (c) = Cosigned By      Initials Name Provider Type    AS Kevin Arroyo, PT Physical Therapist                     Modalities       Row Name 25 0500             Moist Heat    MH Applied Yes  -AS      Location Left Shoulder - Sitting  -AS      PT Moist Heat Minutes 10  -AS      MH Prior to Rx Yes  -AS         Functional Testing    Outcome Measure Options Quick DASH  -AS                User Key  (r) = Recorded By, (t) = Taken By, (c) = Cosigned By      Initials Name Provider Type    AS Kevin Arroyo, PT Physical Therapist                   OP Exercises       Row Name 25 0633 25 0504           "Subjective    Subjective Comments -- Patient states his shoulder is \"fantastic.\"  -AS        Total Minutes    08621 - PT Therapeutic Exercise Minutes 25  -AS --        Exercise 1    Exercise Name 1 -- 3-Way Cane  -AS     Reps 1 -- 15 each  -AS     Time 1 -- 5 sec hold each  -AS        Exercise 2    Exercise Name 2 -- Sleeper Stretch  -AS        Exercise 3    Exercise Name 3 -- Serratus Punches  -AS     Reps 3 -- 25  -AS     Time 3 -- 5#  -AS        Exercise 4    Exercise Name 4 -- S/L ER  -AS     Reps 4 -- 25  -AS     Time 4 -- 5#  -AS        Exercise 5    Exercise Name 5 -- Prone Y, T  -AS        Exercise 6    Exercise Name 6 -- Empty/Full Can  -AS     Reps 6 -- 25 each  -AS     Time 6 -- 5#  -AS        Exercise 7    Exercise Name 7 -- Rows  -AS     Reps 7 -- 25  -AS     Time 7 -- Gold  -AS        Exercise 8    Exercise Name 8 -- Extensions  -AS     Reps 8 -- 25  -AS     Time 8 -- Gold  -AS        Exercise 9    Exercise Name 9 -- IR  -AS     Reps 9 -- 25  -AS     Time 9 -- Gold  -AS        Exercise 10    Exercise Name 10 -- ER  -AS     Reps 10 -- 25  -AS     Time 10 -- Gold  -AS        Exercise 11    Exercise Name 11 -- Pulley's - Flex & ABD  -AS     Time 11 -- 3 min each  -AS        Exercise 12    Exercise Name 12 -- Wall Slides - Flex Only  -AS     Reps 12 -- 15  -AS     Time 12 -- 5 sec hold each  -AS               User Key  (r) = Recorded By, (t) = Taken By, (c) = Cosigned By      Initials Name Provider Type    AS Kevin Arroyo, PT Physical Therapist                             Manual Rx (Last 36 Hours)       Manual Treatments       Row Name 06/02/25 0500             Manual Rx 1    Manual Rx 1 Location --  -AS      Manual Rx 1 Type --  -AS      Manual Rx 1 Duration --  -AS                User Key  (r) = Recorded By, (t) = Taken By, (c) = Cosigned By      Initials Name Provider Type    AS Kevin Arroyo, PT Physical Therapist                             Outcome Measure Options: Quick DASH   "       Time Calculation:   Start Time: 0553  Stop Time: 0632  Time Calculation (min): 39 min  Timed Charges  73384 - PT Therapeutic Exercise Minutes: 25  Untimed Charges  PT Moist Heat Minutes: 10  Total Minutes  Timed Charges Total Minutes: 25  Untimed Charges Total Minutes: 10   Total Minutes: 35      PT G-Codes  Outcome Measure Options: Quick DASH         Kevin Arroyo, PT  6/2/2025

## 2025-06-02 NOTE — THERAPY TREATMENT NOTE
Outpatient Physical Therapy Ortho Treatment Note   [facility]     Patient Name: Jose Luis Zelaya  : 1975  MRN: [id number]  Today's Date: 2025      Visit Date: 2025    Visit Dx:    ICD-10-CM ICD-9-CM   1. Status post total shoulder arthroplasty, left  Z96.612 V43.61       There is no problem list on file for this patient.       Past Medical History:   Diagnosis Date    Anxiety     Arthritis     Depression     Diabetes mellitus     History of kidney stones     Hyperlipidemia     Hypertension     PEDRITO (obstructive sleep apnea)     cpap    PTSD (post-traumatic stress disorder)     Shoulder pain     left        Past Surgical History:   Procedure Laterality Date    KNEE ARTHROSCOPY Left     TOTAL SHOULDER ARTHROPLASTY Left 2025    Procedure: LEFT TOTAL SHOULDER ARTHROPLASTY;  Surgeon: Juan Sweet MD;  Location: [facility] OR [facility];  Service: Orthopedics;  Laterality: Left;    VASECTOMY                          PT Assessment/Plan       Row Name 25 0500          PT Assessment    Assessment Comments Patient continues to do well with PT at this time. Patient and PT feel he can be D/C patient onto a HEP only at this time.  -AS        PT Plan    PT Plan Comments Plan to D/C patient onto a HEP only at this time.  -AS               User Key  (r) = Recorded By, (t) = Taken By, (c) = Cosigned By      Initials Name Provider Type    AS Kevin Arroyo, PT Physical Therapist                     Modalities       Row Name 25 0500             Moist Heat    MH Applied Yes  -AS      Location Left Shoulder - Sitting  -AS      PT Moist Heat Minutes 10  -AS      MH Prior to Rx Yes  -AS         Functional Testing    Outcome Measure Options Quick DASH  -AS                User Key  (r) = Recorded By, (t) = Taken By, (c) = Cosigned By      Initials Name Provider Type    AS Kevin Arroyo, PT Physical Therapist                   OP Exercises       Row Name 25 0633 25 0507           "Subjective    Subjective Comments -- Patient states his shoulder is \"fantastic.\"  -AS        Total Minutes    34426 - PT Therapeutic Exercise Minutes 25  -AS --        Exercise 1    Exercise Name 1 -- 3-Way Cane  -AS     Reps 1 -- 15 each  -AS     Time 1 -- 5 sec hold each  -AS        Exercise 2    Exercise Name 2 -- Sleeper Stretch  -AS        Exercise 3    Exercise Name 3 -- Serratus Punches  -AS     Reps 3 -- 25  -AS     Time 3 -- 5#  -AS        Exercise 4    Exercise Name 4 -- S/L ER  -AS     Reps 4 -- 25  -AS     Time 4 -- 5#  -AS        Exercise 5    Exercise Name 5 -- Prone Y, T  -AS        Exercise 6    Exercise Name 6 -- Empty/Full Can  -AS     Reps 6 -- 25 each  -AS     Time 6 -- 5#  -AS        Exercise 7    Exercise Name 7 -- Rows  -AS     Reps 7 -- 25  -AS     Time 7 -- Gold  -AS        Exercise 8    Exercise Name 8 -- Extensions  -AS     Reps 8 -- 25  -AS     Time 8 -- Gold  -AS        Exercise 9    Exercise Name 9 -- IR  -AS     Reps 9 -- 25  -AS     Time 9 -- Gold  -AS        Exercise 10    Exercise Name 10 -- ER  -AS     Reps 10 -- 25  -AS     Time 10 -- Gold  -AS        Exercise 11    Exercise Name 11 -- Pulley's - Flex & ABD  -AS     Time 11 -- 3 min each  -AS        Exercise 12    Exercise Name 12 -- Wall Slides - Flex Only  -AS     Reps 12 -- 15  -AS     Time 12 -- 5 sec hold each  -AS               User Key  (r) = Recorded By, (t) = Taken By, (c) = Cosigned By      Initials Name Provider Type    AS Kevin Arroyo, PT Physical Therapist                             Manual Rx (Last 36 Hours)       Manual Treatments       Row Name 06/02/25 0500             Manual Rx 1    Manual Rx 1 Location --  -AS      Manual Rx 1 Type --  -AS      Manual Rx 1 Duration --  -AS                User Key  (r) = Recorded By, (t) = Taken By, (c) = Cosigned By      Initials Name Provider Type    AS Kevin Arroyo, PT Physical Therapist                             Outcome Measure Options: Quick DASH   "       Time Calculation:   Start Time: 0553  Stop Time: 0632  Time Calculation (min): 39 min  Timed Charges  36358 - PT Therapeutic Exercise Minutes: 25  Untimed Charges  PT Moist Heat Minutes: 10  Total Minutes  Timed Charges Total Minutes: 25  Untimed Charges Total Minutes: 10   Total Minutes: 35  Therapy Charges for Today       Code Description Service Date Service Provider Modifiers Qty    16665844087 HC PT THER PROC EA 15 MIN 6/2/2025 Kevin Arroyo, PT GP 2            PT G-Codes  Outcome Measure Options: Quick DASH         Kevin Arroyo, PT  6/2/2025

## (undated) DEVICE — ZIP 16 SURGICAL SKIN CLOSURE DEVICE, PSA: Brand: ZIP 16 SURGICAL SKIN CLOSURE DEVICE

## (undated) DEVICE — GLV SURG BIOGEL LTX PF 6 1/2

## (undated) DEVICE — YANKAUER,POOLE TIP,STERILE,50/CS: Brand: MEDLINE

## (undated) DEVICE — DUAL CUT SAGITTAL BLADE

## (undated) DEVICE — 3.2MM K-WIRE, TROCAR TIP
Type: IMPLANTABLE DEVICE | Site: SHOULDER | Status: NON-FUNCTIONAL
Brand: EQUINOXE
Removed: 2025-02-20

## (undated) DEVICE — PK SHLDR OPN 40

## (undated) DEVICE — SKIN PREP TRAY W/CHG: Brand: MEDLINE INDUSTRIES, INC.

## (undated) DEVICE — SUT VIC 0 CT1 36IN J946H

## (undated) DEVICE — BLANKT WARM LOWR/BDY 100X120CM

## (undated) DEVICE — SHEET, DRAPE, SPLIT, STERILE: Brand: MEDLINE

## (undated) DEVICE — DRESSING,GAUZE,XEROFORM,CURAD,1"X8",ST: Brand: CURAD

## (undated) DEVICE — HANDPIECE SET WITH COAXIAL HIGH FLOW TIP AND SUCTION TUBE: Brand: INTERPULSE

## (undated) DEVICE — GLV SURG BIOGEL LTX PF 8 1/2

## (undated) DEVICE — BNDG ELAS CO-FLEX SLF ADHR 4IN5YD LF STRL

## (undated) DEVICE — PATIENT RETURN ELECTRODE, SINGLE-USE, CONTACT QUALITY MONITORING, ADULT, WITH 9FT CORD, FOR PATIENTS WEIGING OVER 33LBS. (15KG): Brand: MEGADYNE

## (undated) DEVICE — 450 ML BOTTLE OF 0.05% CHLORHEXIDINE GLUCONATE IN 99.95% STERILE WATER FOR IRRIGATION, USP AND APPLICATOR.: Brand: IRRISEPT ANTIMICROBIAL WOUND LAVAGE

## (undated) DEVICE — MAT FLR ABSORBENT LG 4FT 10 2.5FT

## (undated) DEVICE — DRAPE,U/ SHT,SPLIT,PLAS,STERIL: Brand: MEDLINE

## (undated) DEVICE — CEMENT MIXING SYSTEM WITH FEMORAL BREAKWAY NOZZLE: Brand: REVOLUTION

## (undated) DEVICE — SYR LL TP 10ML STRL

## (undated) DEVICE — GLND KWIRE
Type: IMPLANTABLE DEVICE | Site: SHOULDER | Status: NON-FUNCTIONAL
Brand: EQUINOXE
Removed: 2025-02-20

## (undated) DEVICE — SUT ETHIB 2 CV V37 MS/4 30IN MX69G

## (undated) DEVICE — GLV SURG BIOGEL LTX PF 8

## (undated) DEVICE — SUT VIC 2/0 X1 27IN J459H